# Patient Record
Sex: FEMALE | Race: WHITE | NOT HISPANIC OR LATINO | Employment: OTHER | URBAN - METROPOLITAN AREA
[De-identification: names, ages, dates, MRNs, and addresses within clinical notes are randomized per-mention and may not be internally consistent; named-entity substitution may affect disease eponyms.]

---

## 2017-06-01 ENCOUNTER — COMMUNICATION - HEALTHEAST (OUTPATIENT)
Dept: FAMILY MEDICINE | Facility: CLINIC | Age: 37
End: 2017-06-01

## 2017-06-01 DIAGNOSIS — I10 HTN (HYPERTENSION): ICD-10-CM

## 2017-06-02 ENCOUNTER — COMMUNICATION - HEALTHEAST (OUTPATIENT)
Dept: TELEHEALTH | Facility: CLINIC | Age: 37
End: 2017-06-02

## 2017-06-02 ENCOUNTER — OFFICE VISIT - HEALTHEAST (OUTPATIENT)
Dept: FAMILY MEDICINE | Facility: CLINIC | Age: 37
End: 2017-06-02

## 2017-06-02 DIAGNOSIS — I10 ESSENTIAL HYPERTENSION: ICD-10-CM

## 2017-06-02 DIAGNOSIS — F41.9 ANXIETY: ICD-10-CM

## 2017-06-02 ASSESSMENT — MIFFLIN-ST. JEOR: SCORE: 1859.22

## 2017-06-08 ENCOUNTER — COMMUNICATION - HEALTHEAST (OUTPATIENT)
Dept: SCHEDULING | Facility: CLINIC | Age: 37
End: 2017-06-08

## 2017-06-13 ENCOUNTER — OFFICE VISIT - HEALTHEAST (OUTPATIENT)
Dept: FAMILY MEDICINE | Facility: CLINIC | Age: 37
End: 2017-06-13

## 2017-06-13 DIAGNOSIS — I10 ESSENTIAL HYPERTENSION WITH GOAL BLOOD PRESSURE LESS THAN 130/80: ICD-10-CM

## 2017-06-13 DIAGNOSIS — D50.9 IRON DEFICIENCY ANEMIA: ICD-10-CM

## 2017-07-17 ENCOUNTER — COMMUNICATION - HEALTHEAST (OUTPATIENT)
Dept: FAMILY MEDICINE | Facility: CLINIC | Age: 37
End: 2017-07-17

## 2017-07-17 DIAGNOSIS — E28.2 PCOS (POLYCYSTIC OVARIAN SYNDROME): ICD-10-CM

## 2017-07-19 ENCOUNTER — COMMUNICATION - HEALTHEAST (OUTPATIENT)
Dept: FAMILY MEDICINE | Facility: CLINIC | Age: 37
End: 2017-07-19

## 2017-07-19 DIAGNOSIS — E28.2 PCOS (POLYCYSTIC OVARIAN SYNDROME): ICD-10-CM

## 2017-07-21 ENCOUNTER — COMMUNICATION - HEALTHEAST (OUTPATIENT)
Dept: FAMILY MEDICINE | Facility: CLINIC | Age: 37
End: 2017-07-21

## 2017-07-21 DIAGNOSIS — E28.2 PCOS (POLYCYSTIC OVARIAN SYNDROME): ICD-10-CM

## 2017-07-27 ENCOUNTER — COMMUNICATION - HEALTHEAST (OUTPATIENT)
Dept: FAMILY MEDICINE | Facility: CLINIC | Age: 37
End: 2017-07-27

## 2017-07-27 DIAGNOSIS — E28.2 PCOS (POLYCYSTIC OVARIAN SYNDROME): ICD-10-CM

## 2017-08-10 ENCOUNTER — COMMUNICATION - HEALTHEAST (OUTPATIENT)
Dept: TELEHEALTH | Facility: CLINIC | Age: 37
End: 2017-08-10

## 2017-08-11 ENCOUNTER — COMMUNICATION - HEALTHEAST (OUTPATIENT)
Dept: HEALTH INFORMATION MANAGEMENT | Facility: CLINIC | Age: 37
End: 2017-08-11

## 2017-08-14 ENCOUNTER — COMMUNICATION - HEALTHEAST (OUTPATIENT)
Dept: FAMILY MEDICINE | Facility: CLINIC | Age: 37
End: 2017-08-14

## 2017-08-14 DIAGNOSIS — I10 HTN (HYPERTENSION): ICD-10-CM

## 2017-08-18 ENCOUNTER — OFFICE VISIT - HEALTHEAST (OUTPATIENT)
Dept: FAMILY MEDICINE | Facility: CLINIC | Age: 37
End: 2017-08-18

## 2017-08-18 DIAGNOSIS — H57.13 EYE PAIN, BILATERAL: ICD-10-CM

## 2017-08-21 ENCOUNTER — RECORDS - HEALTHEAST (OUTPATIENT)
Dept: ADMINISTRATIVE | Facility: OTHER | Age: 37
End: 2017-08-21

## 2017-09-25 ENCOUNTER — OFFICE VISIT - HEALTHEAST (OUTPATIENT)
Dept: FAMILY MEDICINE | Facility: CLINIC | Age: 37
End: 2017-09-25

## 2017-09-25 DIAGNOSIS — I10 ESSENTIAL HYPERTENSION WITH GOAL BLOOD PRESSURE LESS THAN 130/80: ICD-10-CM

## 2017-09-25 DIAGNOSIS — R63.5 WEIGHT GAIN: ICD-10-CM

## 2017-09-25 DIAGNOSIS — Z00.00 ROUTINE GENERAL MEDICAL EXAMINATION AT A HEALTH CARE FACILITY: ICD-10-CM

## 2017-09-25 DIAGNOSIS — D50.9 IRON DEFICIENCY ANEMIA: ICD-10-CM

## 2017-09-25 DIAGNOSIS — E28.2 PCOS (POLYCYSTIC OVARIAN SYNDROME): ICD-10-CM

## 2017-09-25 LAB
CHOLEST SERPL-MCNC: 194 MG/DL
FASTING STATUS PATIENT QL REPORTED: NO
HDLC SERPL-MCNC: 58 MG/DL
LDLC SERPL CALC-MCNC: 106 MG/DL
TRIGL SERPL-MCNC: 148 MG/DL

## 2017-09-25 ASSESSMENT — MIFFLIN-ST. JEOR: SCORE: 1952.26

## 2017-10-27 ENCOUNTER — COMMUNICATION - HEALTHEAST (OUTPATIENT)
Dept: FAMILY MEDICINE | Facility: CLINIC | Age: 37
End: 2017-10-27

## 2017-10-27 DIAGNOSIS — I10 HTN (HYPERTENSION): ICD-10-CM

## 2017-10-31 ENCOUNTER — COMMUNICATION - HEALTHEAST (OUTPATIENT)
Dept: FAMILY MEDICINE | Facility: CLINIC | Age: 37
End: 2017-10-31

## 2017-10-31 DIAGNOSIS — E28.2 PCOS (POLYCYSTIC OVARIAN SYNDROME): ICD-10-CM

## 2018-04-18 ENCOUNTER — OFFICE VISIT - HEALTHEAST (OUTPATIENT)
Dept: FAMILY MEDICINE | Facility: CLINIC | Age: 38
End: 2018-04-18

## 2018-04-18 DIAGNOSIS — N89.8 VAGINAL DISCHARGE: ICD-10-CM

## 2018-04-18 DIAGNOSIS — I10 ESSENTIAL HYPERTENSION: ICD-10-CM

## 2018-04-18 DIAGNOSIS — D50.9 IRON DEFICIENCY ANEMIA: ICD-10-CM

## 2018-04-18 DIAGNOSIS — E28.2 PCOS (POLYCYSTIC OVARIAN SYNDROME): ICD-10-CM

## 2018-04-18 LAB
ALBUMIN SERPL-MCNC: 4 G/DL (ref 3.5–5)
ALP SERPL-CCNC: 54 U/L (ref 45–120)
ALT SERPL W P-5'-P-CCNC: 15 U/L (ref 0–45)
ANION GAP SERPL CALCULATED.3IONS-SCNC: 8 MMOL/L (ref 5–18)
AST SERPL W P-5'-P-CCNC: 16 U/L (ref 0–40)
BILIRUB SERPL-MCNC: 0.5 MG/DL (ref 0–1)
BUN SERPL-MCNC: 15 MG/DL (ref 8–22)
CALCIUM SERPL-MCNC: 9.8 MG/DL (ref 8.5–10.5)
CHLORIDE BLD-SCNC: 103 MMOL/L (ref 98–107)
CLUE CELLS: NORMAL
CO2 SERPL-SCNC: 29 MMOL/L (ref 22–31)
CREAT SERPL-MCNC: 0.94 MG/DL (ref 0.6–1.1)
ERYTHROCYTE [DISTWIDTH] IN BLOOD BY AUTOMATED COUNT: 17.3 % (ref 11–14.5)
GFR SERPL CREATININE-BSD FRML MDRD: >60 ML/MIN/1.73M2
GLUCOSE BLD-MCNC: 89 MG/DL (ref 70–125)
HBA1C MFR BLD: 5 % (ref 3.5–6)
HCT VFR BLD AUTO: 29.5 % (ref 35–47)
HGB BLD-MCNC: 9.5 G/DL (ref 12–16)
MCH RBC QN AUTO: 22.7 PG (ref 27–34)
MCHC RBC AUTO-ENTMCNC: 32.3 G/DL (ref 32–36)
MCV RBC AUTO: 70 FL (ref 80–100)
PLATELET # BLD AUTO: 265 THOU/UL (ref 140–440)
PMV BLD AUTO: 9.3 FL (ref 7–10)
POTASSIUM BLD-SCNC: 4.4 MMOL/L (ref 3.5–5)
PROT SERPL-MCNC: 7 G/DL (ref 6–8)
RBC # BLD AUTO: 4.19 MILL/UL (ref 3.8–5.4)
SODIUM SERPL-SCNC: 140 MMOL/L (ref 136–145)
TRICHOMONAS, WET PREP: NORMAL
WBC: 4.4 THOU/UL (ref 4–11)
YEAST, WET PREP: NORMAL

## 2018-05-01 ENCOUNTER — COMMUNICATION - HEALTHEAST (OUTPATIENT)
Dept: FAMILY MEDICINE | Facility: CLINIC | Age: 38
End: 2018-05-01

## 2018-05-01 DIAGNOSIS — N89.8 VAGINA ITCHING: ICD-10-CM

## 2018-05-01 DIAGNOSIS — D50.9 IRON DEFICIENCY ANEMIA: ICD-10-CM

## 2018-05-20 ENCOUNTER — COMMUNICATION - HEALTHEAST (OUTPATIENT)
Dept: FAMILY MEDICINE | Facility: CLINIC | Age: 38
End: 2018-05-20

## 2018-05-20 DIAGNOSIS — I10 HTN (HYPERTENSION): ICD-10-CM

## 2018-09-30 ENCOUNTER — COMMUNICATION - HEALTHEAST (OUTPATIENT)
Dept: FAMILY MEDICINE | Facility: CLINIC | Age: 38
End: 2018-09-30

## 2018-09-30 DIAGNOSIS — I10 ESSENTIAL HYPERTENSION WITH GOAL BLOOD PRESSURE LESS THAN 130/80: ICD-10-CM

## 2018-11-05 ENCOUNTER — OFFICE VISIT - HEALTHEAST (OUTPATIENT)
Dept: FAMILY MEDICINE | Facility: CLINIC | Age: 38
End: 2018-11-05

## 2018-11-05 DIAGNOSIS — R00.2 HEART PALPITATIONS: ICD-10-CM

## 2018-11-05 DIAGNOSIS — D50.9 IRON DEFICIENCY ANEMIA: ICD-10-CM

## 2018-11-05 DIAGNOSIS — Z13.220 LIPID SCREENING: ICD-10-CM

## 2018-11-05 DIAGNOSIS — Z23 IMMUNIZATION DUE: ICD-10-CM

## 2018-11-05 DIAGNOSIS — I10 ESSENTIAL HYPERTENSION: ICD-10-CM

## 2018-11-05 DIAGNOSIS — Z00.00 ROUTINE GENERAL MEDICAL EXAMINATION AT A HEALTH CARE FACILITY: ICD-10-CM

## 2018-11-05 DIAGNOSIS — E28.2 PCOS (POLYCYSTIC OVARIAN SYNDROME): ICD-10-CM

## 2018-11-05 LAB
ALBUMIN SERPL-MCNC: 4.2 G/DL (ref 3.5–5)
ALP SERPL-CCNC: 57 U/L (ref 45–120)
ALT SERPL W P-5'-P-CCNC: 14 U/L (ref 0–45)
ANION GAP SERPL CALCULATED.3IONS-SCNC: 11 MMOL/L (ref 5–18)
AST SERPL W P-5'-P-CCNC: 15 U/L (ref 0–40)
ATRIAL RATE - MUSE: 68 BPM
BILIRUB SERPL-MCNC: 0.7 MG/DL (ref 0–1)
BUN SERPL-MCNC: 10 MG/DL (ref 8–22)
CALCIUM SERPL-MCNC: 9.8 MG/DL (ref 8.5–10.5)
CHLORIDE BLD-SCNC: 106 MMOL/L (ref 98–107)
CHOLEST SERPL-MCNC: 208 MG/DL
CO2 SERPL-SCNC: 23 MMOL/L (ref 22–31)
CREAT SERPL-MCNC: 0.79 MG/DL (ref 0.6–1.1)
DIASTOLIC BLOOD PRESSURE - MUSE: NORMAL MMHG
ERYTHROCYTE [DISTWIDTH] IN BLOOD BY AUTOMATED COUNT: 14.6 % (ref 11–14.5)
FASTING STATUS PATIENT QL REPORTED: YES
FERRITIN SERPL-MCNC: 9 NG/ML (ref 10–130)
GFR SERPL CREATININE-BSD FRML MDRD: >60 ML/MIN/1.73M2
GLUCOSE BLD-MCNC: 89 MG/DL (ref 70–125)
HBA1C MFR BLD: 5.1 % (ref 3.5–6)
HCT VFR BLD AUTO: 38.5 % (ref 35–47)
HDLC SERPL-MCNC: 53 MG/DL
HGB BLD-MCNC: 13 G/DL (ref 12–16)
INTERPRETATION ECG - MUSE: NORMAL
IRON SATN MFR SERPL: 77 % (ref 20–50)
IRON SERPL-MCNC: 314 UG/DL (ref 42–175)
LDLC SERPL CALC-MCNC: 130 MG/DL
MCH RBC QN AUTO: 28.5 PG (ref 27–34)
MCHC RBC AUTO-ENTMCNC: 33.7 G/DL (ref 32–36)
MCV RBC AUTO: 84 FL (ref 80–100)
P AXIS - MUSE: -5 DEGREES
PLATELET # BLD AUTO: 252 THOU/UL (ref 140–440)
PMV BLD AUTO: 8.2 FL (ref 7–10)
POTASSIUM BLD-SCNC: 3.8 MMOL/L (ref 3.5–5)
PR INTERVAL - MUSE: 178 MS
PROT SERPL-MCNC: 7 G/DL (ref 6–8)
QRS DURATION - MUSE: 92 MS
QT - MUSE: 410 MS
QTC - MUSE: 435 MS
R AXIS - MUSE: -23 DEGREES
RBC # BLD AUTO: 4.56 MILL/UL (ref 3.8–5.4)
SODIUM SERPL-SCNC: 140 MMOL/L (ref 136–145)
SYSTOLIC BLOOD PRESSURE - MUSE: NORMAL MMHG
T AXIS - MUSE: 1 DEGREES
TIBC SERPL-MCNC: 406 UG/DL (ref 313–563)
TRANSFERRIN SERPL-MCNC: 325 MG/DL (ref 212–360)
TRIGL SERPL-MCNC: 123 MG/DL
TSH SERPL DL<=0.005 MIU/L-ACNC: 3.14 UIU/ML (ref 0.3–5)
VENTRICULAR RATE- MUSE: 68 BPM
WBC: 4.8 THOU/UL (ref 4–11)

## 2018-11-05 ASSESSMENT — MIFFLIN-ST. JEOR: SCORE: 1928.68

## 2018-11-12 ENCOUNTER — HOSPITAL ENCOUNTER (OUTPATIENT)
Dept: CARDIOLOGY | Facility: HOSPITAL | Age: 38
Discharge: HOME | End: 2018-11-12
Attending: FAMILY MEDICINE

## 2018-11-12 DIAGNOSIS — R00.2 HEART PALPITATIONS: ICD-10-CM

## 2018-12-26 ENCOUNTER — COMMUNICATION - HEALTHEAST (OUTPATIENT)
Dept: FAMILY MEDICINE | Facility: CLINIC | Age: 38
End: 2018-12-26

## 2018-12-26 DIAGNOSIS — E28.2 PCOS (POLYCYSTIC OVARIAN SYNDROME): ICD-10-CM

## 2019-01-09 ENCOUNTER — COMMUNICATION - HEALTHEAST (OUTPATIENT)
Dept: FAMILY MEDICINE | Facility: CLINIC | Age: 39
End: 2019-01-09

## 2019-01-09 DIAGNOSIS — I10 ESSENTIAL HYPERTENSION WITH GOAL BLOOD PRESSURE LESS THAN 130/80: ICD-10-CM

## 2019-05-21 ENCOUNTER — COMMUNICATION - HEALTHEAST (OUTPATIENT)
Dept: FAMILY MEDICINE | Facility: CLINIC | Age: 39
End: 2019-05-21

## 2019-05-21 DIAGNOSIS — I10 HTN (HYPERTENSION): ICD-10-CM

## 2019-06-26 ENCOUNTER — COMMUNICATION - HEALTHEAST (OUTPATIENT)
Dept: FAMILY MEDICINE | Facility: CLINIC | Age: 39
End: 2019-06-26

## 2019-06-26 DIAGNOSIS — E28.2 PCOS (POLYCYSTIC OVARIAN SYNDROME): ICD-10-CM

## 2019-08-07 ENCOUNTER — COMMUNICATION - HEALTHEAST (OUTPATIENT)
Dept: TELEHEALTH | Facility: CLINIC | Age: 39
End: 2019-08-07

## 2019-10-22 ENCOUNTER — COMMUNICATION - HEALTHEAST (OUTPATIENT)
Dept: FAMILY MEDICINE | Facility: CLINIC | Age: 39
End: 2019-10-22

## 2019-10-22 DIAGNOSIS — I10 ESSENTIAL HYPERTENSION WITH GOAL BLOOD PRESSURE LESS THAN 130/80: ICD-10-CM

## 2019-11-13 ENCOUNTER — OFFICE VISIT - HEALTHEAST (OUTPATIENT)
Dept: FAMILY MEDICINE | Facility: CLINIC | Age: 39
End: 2019-11-13

## 2019-11-13 DIAGNOSIS — E66.01 MORBID OBESITY (H): ICD-10-CM

## 2019-11-13 DIAGNOSIS — D50.0 IRON DEFICIENCY ANEMIA DUE TO CHRONIC BLOOD LOSS: ICD-10-CM

## 2019-11-13 DIAGNOSIS — I10 HTN (HYPERTENSION): ICD-10-CM

## 2019-11-13 DIAGNOSIS — E28.2 PCOS (POLYCYSTIC OVARIAN SYNDROME): ICD-10-CM

## 2019-11-13 DIAGNOSIS — N92.4 EXCESSIVE BLEEDING IN PREMENOPAUSAL PERIOD: ICD-10-CM

## 2019-11-13 DIAGNOSIS — Z00.00 ROUTINE GENERAL MEDICAL EXAMINATION AT A HEALTH CARE FACILITY: ICD-10-CM

## 2019-11-13 DIAGNOSIS — I10 ESSENTIAL HYPERTENSION WITH GOAL BLOOD PRESSURE LESS THAN 130/80: ICD-10-CM

## 2019-11-13 DIAGNOSIS — Z13.220 LIPID SCREENING: ICD-10-CM

## 2019-11-13 DIAGNOSIS — Z23 IMMUNIZATION DUE: ICD-10-CM

## 2019-11-13 DIAGNOSIS — K44.9 DIAPHRAGMATIC HERNIA WITHOUT OBSTRUCTION AND WITHOUT GANGRENE: ICD-10-CM

## 2019-11-13 LAB
ALBUMIN SERPL-MCNC: 4.3 G/DL (ref 3.5–5)
ALP SERPL-CCNC: 51 U/L (ref 45–120)
ALT SERPL W P-5'-P-CCNC: 20 U/L (ref 0–45)
ANION GAP SERPL CALCULATED.3IONS-SCNC: 10 MMOL/L (ref 5–18)
AST SERPL W P-5'-P-CCNC: 19 U/L (ref 0–40)
BILIRUB SERPL-MCNC: 0.7 MG/DL (ref 0–1)
BUN SERPL-MCNC: 11 MG/DL (ref 8–22)
CALCIUM SERPL-MCNC: 9.7 MG/DL (ref 8.5–10.5)
CHLORIDE BLD-SCNC: 105 MMOL/L (ref 98–107)
CHOLEST SERPL-MCNC: 207 MG/DL
CO2 SERPL-SCNC: 24 MMOL/L (ref 22–31)
CREAT SERPL-MCNC: 0.82 MG/DL (ref 0.6–1.1)
ERYTHROCYTE [DISTWIDTH] IN BLOOD BY AUTOMATED COUNT: 15.8 % (ref 11–14.5)
FASTING STATUS PATIENT QL REPORTED: YES
FERRITIN SERPL-MCNC: 3 NG/ML (ref 10–130)
GFR SERPL CREATININE-BSD FRML MDRD: >60 ML/MIN/1.73M2
GLUCOSE BLD-MCNC: 83 MG/DL (ref 70–125)
HBA1C MFR BLD: 4.9 % (ref 3.5–6)
HCT VFR BLD AUTO: 29.4 % (ref 35–47)
HDLC SERPL-MCNC: 60 MG/DL
HGB BLD-MCNC: 9.7 G/DL (ref 12–16)
IRON SATN MFR SERPL: 6 % (ref 20–50)
IRON SERPL-MCNC: 27 UG/DL (ref 42–175)
LDLC SERPL CALC-MCNC: 126 MG/DL
MAGNESIUM SERPL-MCNC: 2 MG/DL (ref 1.8–2.6)
MCH RBC QN AUTO: 23.3 PG (ref 27–34)
MCHC RBC AUTO-ENTMCNC: 32.8 G/DL (ref 32–36)
MCV RBC AUTO: 71 FL (ref 80–100)
PLATELET # BLD AUTO: 264 THOU/UL (ref 140–440)
PMV BLD AUTO: 8.7 FL (ref 7–10)
POTASSIUM BLD-SCNC: 4.1 MMOL/L (ref 3.5–5)
PROT SERPL-MCNC: 6.9 G/DL (ref 6–8)
RBC # BLD AUTO: 4.15 MILL/UL (ref 3.8–5.4)
SODIUM SERPL-SCNC: 139 MMOL/L (ref 136–145)
TIBC SERPL-MCNC: 490 UG/DL (ref 313–563)
TRANSFERRIN SERPL-MCNC: 392 MG/DL (ref 212–360)
TRIGL SERPL-MCNC: 103 MG/DL
TSH SERPL DL<=0.005 MIU/L-ACNC: 2.32 UIU/ML (ref 0.3–5)
WBC: 5.3 THOU/UL (ref 4–11)

## 2019-11-13 ASSESSMENT — MIFFLIN-ST. JEOR: SCORE: 1925.5

## 2019-11-13 ASSESSMENT — PATIENT HEALTH QUESTIONNAIRE - PHQ9: SUM OF ALL RESPONSES TO PHQ QUESTIONS 1-9: 2

## 2019-11-14 ENCOUNTER — AMBULATORY - HEALTHEAST (OUTPATIENT)
Dept: FAMILY MEDICINE | Facility: CLINIC | Age: 39
End: 2019-11-14

## 2019-11-14 DIAGNOSIS — D50.9 IRON DEFICIENCY ANEMIA: ICD-10-CM

## 2019-11-14 RX ORDER — FERROUS SULFATE 325(65) MG
1 TABLET ORAL 2 TIMES DAILY WITH MEALS
Qty: 180 TABLET | Refills: 3 | Status: SHIPPED | OUTPATIENT
Start: 2019-11-14 | End: 2022-10-06 | Stop reason: SINTOL

## 2019-12-03 ENCOUNTER — HOSPITAL ENCOUNTER (OUTPATIENT)
Dept: ULTRASOUND IMAGING | Facility: CLINIC | Age: 39
Discharge: HOME | End: 2019-12-03
Attending: FAMILY MEDICINE

## 2019-12-03 DIAGNOSIS — N92.4 EXCESSIVE BLEEDING IN PREMENOPAUSAL PERIOD: ICD-10-CM

## 2020-01-30 ENCOUNTER — COMMUNICATION - HEALTHEAST (OUTPATIENT)
Dept: FAMILY MEDICINE | Facility: CLINIC | Age: 40
End: 2020-01-30

## 2020-01-30 DIAGNOSIS — I10 ESSENTIAL HYPERTENSION WITH GOAL BLOOD PRESSURE LESS THAN 130/80: ICD-10-CM

## 2020-03-13 ENCOUNTER — VIRTUAL VISIT (OUTPATIENT)
Dept: FAMILY MEDICINE | Facility: OTHER | Age: 40
End: 2020-03-13

## 2020-03-16 ENCOUNTER — OFFICE VISIT - HEALTHEAST (OUTPATIENT)
Dept: FAMILY MEDICINE | Facility: CLINIC | Age: 40
End: 2020-03-16

## 2020-03-16 DIAGNOSIS — R05.9 COUGH: ICD-10-CM

## 2020-03-18 NOTE — PROGRESS NOTES
"Date: 2020 09:12:45  Clinician: Glory Bridges  Clinician NPI: [id number]  Patient: Karyna Hammer  Patient : 1980  Patient Address: [address] [place] [address], [place] [address]  Patient Phone: [phone]  Visit Protocol: URI  Patient Summary:  Karyna is a 39 year old ( : 1980 ) female who initiated a Visit for COVID-19 (Coronavirus) evaluation and screening. When asked the question \"Please sign me up to receive news, health information and promotions from [employer].\", Karyna responded \"No\".    Karyna states her symptoms started gradually 3-6 days ago.   Her symptoms consist of malaise, a cough, and a headache. Karyna also feels feverish.   Symptom details     Cough: Karyna coughs a few times an hour and her cough is not more bothersome at night. Phlegm does not come into her throat when she coughs. She does not believe her cough is caused by post-nasal drip.     Temperature: Her current temperature is 100.0 degrees Fahrenheit.     Headache: She states the headache is mild (1-3 on a 10 point pain scale).      Karyna denies having rhinitis, wheezing, ear pain, sore throat, nasal congestion, teeth pain, enlarged lymph nodes, chills, facial pain or pressure, and myalgias. She also denies double sickening (worsening symptoms after initial improvement), taking antibiotic medication for the symptoms, having recent facial or sinus surgery in the past 60 days, and having a sinus infection within the past year. She is not experiencing dyspnea.   Precipitating events  She has not recently been exposed to someone with influenza. Karyna has not been in close contact with any high risk individuals.   Pertinent COVID-19 (Coronavirus) information  Karyna has not traveled internationally in the last 14 days before the start of her symptoms.   Karyna has not had close contact with a laboratory confirmed positive COVID-19 patient within 14 days of symptom onset. 100.0   Pertinent medical history  Karyna does not get " yeast infections when she takes antibiotics.   Karyna does not need a return to work/school note.   Weight: 230 lbs   Karyna does not smoke or use smokeless tobacco.   She denies pregnancy and denies breastfeeding. She has menstruated in the past month.   Additional information as reported by the patient (free text): My sister was in [place] within the last month (but not 14 days) and has not yet been tested.  Presenting symptoms of fever, headache, and excess tiredness.  Hoping to be cleared for drive up testing to determine Covid-19   Weight: 230 lbs    MEDICATIONS: lisinopril-hydrochlorothiazide oral, labetalol oral, metformin oral, ALLERGIES: NKDA  Clinician Response:  Dear Karyna,   Based on the information you have provided, it does not appear you meet the criteria for Coronavirus (COVID-19) testing.   At this time, we recommend testing only for those people who have symptoms of cough and fever and have either traveled to a known area of infection or have been exposed to someone with laboratory confirmed Coronavirus by close contact.  In your case specifically, I would recommend your sister go get tested if she qualifies (but if her symptoms started more than 14 days after she returned from [place], she would not qualify for testing as that is not a risk then of exposure). If she does qualify, I would recommend you await her results and if positive, you would need testing. In the meantime, if she does meet criteria for testing, l would recommend you go into isolation in anticipation of her test. Isolation and symptom control are the recommended treatment for COVID either way.&nbsp;        What does this mean?  Isolate Yourself:   Isolate yourself at home.  Do Not allow any visitors  Do Not go to work or school  Do Not go to [employer],  centers, shopping, or other public places.  Do Not shake hands.  Avoid close contact with others (hugging, kissing).   Protect Others:   Cover Your Mouth and Nose with a mask,  disposable tissue or wash cloth to avoid spreading germs to others.  Wash your hands and face frequently with soap and water.   If you have not developed a cough with fever by day 15 of isolation you are considered uninfected.  If you develop cough and fever, submit a new visit to us so we can arrange [facility] COVID testing.  Thank you for limiting contact with others, wearing a simple mask to cover your cough, practice good hand hygiene habits and accessing our [employer] services where possible to limit the spread of this virus.     Coronavirus - General Information:   The coronavirus infection starts within 14 days of an exposure.  Symptoms are those of a respiratory infection (such as fever, cough).  If you have not had symptoms by day 15, you should be considered uninfected by coronavirus.   Coronavirus - Symptoms:   The coronavirus can cause a respiratory illness, such as bronchitis or pneumonia.  The most common symptoms are: cough, fever, and shortness of breath.  Other symptoms are: body aches, chills, diarrhea, fatigue, headache, runny nose, and sore throat   Coronavirus - Exposure Risk Factors:   Exposure to a person who has been diagnosed with coronavirus.  Travel from an area with recent local transmission of coronavirus.  The CDC (www.cdc.gov) has the most up-to-date list of where the coronavirus outbreak is occurring.   Coronavirus - Spreading:   The virus likely spreads through respiratory droplets produced when a person coughs or sneezes. These respiratory droplets can travel approximately 6 feet and can remain on surfaces. Common disinfectants will kill the virus.  The CDC currently does not recommend healthy people wear masks.   Coronavirus - Protect Yourself:   Avoid close contact with people known to have this new coronavirus infection.  Wash hands often with soap and water or alcohol-based hand .  Avoid touching the eyes, nose or mouth.   Thank you for limiting contact with others, wearing  a simple mask to cover your cough, practice good hand hygiene habits and accessing our virtual services where possible to limit the spread of this virus.  For more information about COVID19 and options for caring for yourself at home, please visit the CDC website at&nbsp;https://www.cdc.gov/coronavirus/2019-ncov/about/steps-when-sick.html  For more options for care at [facility], please visit our website at&nbsp;[email or web address]             Diagnosis: Cough  Diagnosis ICD: R05

## 2020-03-22 ENCOUNTER — COMMUNICATION - HEALTHEAST (OUTPATIENT)
Dept: FAMILY MEDICINE | Facility: CLINIC | Age: 40
End: 2020-03-22

## 2020-03-23 ENCOUNTER — COMMUNICATION - HEALTHEAST (OUTPATIENT)
Dept: FAMILY MEDICINE | Facility: CLINIC | Age: 40
End: 2020-03-23

## 2020-03-24 LAB
COVID OVERALL RESULT - HISTORICAL: NOT DETECTED
PAN SARS RNA (HISTORICAL CONVERSION): NEGATIVE
PATIENT SYMPTOMATIC (HISTORICAL CONVERSION): NORMAL
SARS COV 2 RNA - HISTORICAL: NEGATIVE
SOURCE ARUP COVID - HISTORICAL: NORMAL

## 2020-03-25 ENCOUNTER — NURSE TRIAGE (OUTPATIENT)
Dept: NURSING | Facility: CLINIC | Age: 40
End: 2020-03-25

## 2020-03-25 ENCOUNTER — VIRTUAL VISIT (OUTPATIENT)
Dept: FAMILY MEDICINE | Facility: OTHER | Age: 40
End: 2020-03-25

## 2020-03-25 ENCOUNTER — COMMUNICATION - HEALTHEAST (OUTPATIENT)
Dept: SCHEDULING | Facility: CLINIC | Age: 40
End: 2020-03-25

## 2020-03-26 ENCOUNTER — COMMUNICATION - HEALTHEAST (OUTPATIENT)
Dept: FAMILY MEDICINE | Facility: CLINIC | Age: 40
End: 2020-03-26

## 2020-03-26 NOTE — TELEPHONE ENCOUNTER
Patient reports she took COVID-19 test on 3/16- went to [facility]- patient has not received her results yet.     Writer called the clinic, who said the results were still in process. Clinic gave me MD number to give to patient to inquire about her sample.     Patient states she tried calling [facility] and they don't have a sample with her name on it. Patient reports there was a [facility] article showing there were missing samples.     Advised patient to call [facility] lab tomorrow when they open to ask about sample.     Patient verbalized understanding and had no further questions.    Juliane Jenkins, RN/AIDE [facility]    Reason for Disposition    General information question, no triage required and triager able to answer question    Additional Information    Negative: [1] Caller is not with the adult (patient) AND [2] reporting urgent symptoms    Negative: Lab result questions    Negative: Medication questions    Negative: Caller can't be reached by phone    Negative: Caller has already spoken to PCP or another triager    Negative: RN needs further essential information from caller in order to complete triage    Negative: Requesting regular office appointment    Negative: [1] Caller requesting NON-URGENT health information AND [2] PCP's office is the best resource    Negative: Health Information question, no triage required and triager able to answer question    Protocols used: INFORMATION ONLY CALL-A-

## 2020-03-26 NOTE — PROGRESS NOTES
"Date: 2020 18:39:45  Clinician: Rober Berger  Clinician NPI: [id number]  Patient: Karyna Hammer  Patient : 1980  Patient Address: [address] [place] [address], [place] [address]  Patient Phone: [phone]  Visit Protocol: URI  Patient Summary:  Karyna is a 39 year old ( : 1980 ) female who initiated a Visit for COVID-19 (Coronavirus) evaluation and screening. When asked the question \"Please sign me up to receive news, health information and promotions from [employer].\", Karyna responded \"No\".    Karyna states her symptoms started gradually 10-13 days ago.   Her symptoms consist of malaise, chills, a headache, myalgia, a sore throat, and a cough. She is experiencing mild difficulty breathing with activities but can speak normally in full sentences. Karyna also feels feverish.   Symptom details     Cough: Karyna coughs a few times an hour and her cough is more bothersome at night. Phlegm does not come into her throat when she coughs. She does not believe her cough is caused by post-nasal drip.     Sore throat: Karyna reports having mild throat pain (1-3 on a 10 point pain scale), does not have exudate on her tonsils, and can swallow liquids. The lymph nodes in her neck are not enlarged. A rash has not appeared on the skin since the sore throat started.     Temperature: Her current temperature is 99.8 degrees Fahrenheit.     Headache: She states the headache is moderate (4-6 on a 10 point pain scale).      Karyna denies having enlarged lymph nodes, ear pain, rhinitis, teeth pain, facial pain or pressure, wheezing, and nasal congestion. She also denies double sickening (worsening symptoms after initial improvement), having a sinus infection within the past year, taking antibiotic medication for the symptoms, and having recent facial or sinus surgery in the past 60 days.   Precipitating events  Within the past week, Karyna has not been exposed to someone with strep throat. She has not recently been exposed " to someone with influenza. Karyna has not been in close contact with any high risk individuals.   Pertinent COVID-19 (Coronavirus) information  Karyna has not traveled internationally or to the areas where COVID-19 (Coronavirus) is widespread, including cruise ship travel in the last 14 days before the start of her symptoms.   Karyna has not had a close contact with a laboratory-confirmed COVID-19 patient within 14 days of symptom onset. She has had a close contact with a suspected COVID-19 patient within 14 days of symptom onset. Additional information about contact with COVID-19 (Coronavirus) patient as reported by the patient (free text): I was tested on 3/16 at the [facility] site.  They indicated results would be available within 3-5 days and we are now at day 9.  No one can seem to tell me where my test is and I am concerned with my ongoing fever if I test negative   Karyna is not a healthcare worker or a  and does not work in a healthcare facility. She is not a family member of a healthcare worker and does not live with someone who is a healthcare worker.   Pertinent medical history  Karyna does not get yeast infections when she takes antibiotics.   Karyna does not need a return to work/school note.   Weight: 265 lbs   Karyna does not smoke or use smokeless tobacco.   She denies pregnancy and denies breastfeeding. She is currently menstruating.   Additional information as reported by the patient (free text): Per earlier I have already been tested for Covid-19 at the [facility] location on 3/16.  I have been quarantined and symptomatic the entire time.  I cannot obtain an answer on my test status and we are beyond the 5 day window indicated for results.  [facility] is currently investigating my case and others without results.  They verified my sample was not at the [facility] and referred me back to my provider.  I need my results, if I am negative I need to seek separate treatment for the  fever.   Weight: 265 lbs    MEDICATIONS: lisinopril-hydrochlorothiazide oral, labetalol oral, metformin oral, ALLERGIES: NKDA  Clinician Response:  Dear Karyna,  I am sorry you are not feeling well. Your health is our priority. Based on the information you have provided, it is possible that you may have some type of viral infection.  Please read the full treatment plan and see my recommendations below.  Medication information  Because you have a viral infection, antibiotics will not help you get better. Treating a viral infection with antibiotics could actually make you feel worse.  Unless you are allergic to the over-the-counter medication(s) below, I recommend using:     Acetaminophen (Tylenol or store brand) oral tablet. Take 1-2 tablets by mouth every 4-6 hours to help with the discomfort.   Over-the-counter medications do not require a prescription. Ask the pharmacist if you have any questions.  Self care  Steps you can take to be as comfortable as possible:     Rest.    Drink plenty of water and other liquids.    Use throat lozenges.    Gargle with warm salt water (1/4 teaspoon of salt per 8 ounce glass of water).    Suck on frozen items such as popsicles or ice cubes.    Drink hot tea with lemon and honey.    Take a spoonful of honey to reduce your cough.     COVID-19 (Coronavirus) General Information  With the increase in the number of COVID-19 (Coronavirus) cases, we understand you may have some questions. Below is some helpful information on COVID-19 (Coronavirus).  How can I protect myself and others from the COVID-19 (Coronavirus)?  Because there is currently no vaccine to prevent infection, the best way to protect yourself is to avoid being exposed to this virus. Put distance between yourself and other people if COVID-19 (Coronavirus) is spreading in your community. The virus is thought to spread mainly from person-to-person.     Between people who are in close contact with one another (within about 6  about) for a prolonged period (10 minutes or longer).    Through respiratory droplets produced when an infected person coughs or sneezes.     The CDC recommends the following additional steps to protect yourself and others:     Wash your hands often with soap and water for at least 20 seconds, especially after blowing your nose, coughing, or sneezing; going to the bathroom; and before eating or preparing food.  Use an alcohol-based hand  that contains at least 60 percent alcohol if soap and water are not available.        Avoid touching your eyes, nose and mouth with unwashed hands.    Avoid close contact with people who are sick.    Stay home when you are sick.    Cover your cough or sneeze with a tissue, then throw the tissue in the trash.    Clean and disinfect frequently touched objects and surfaces.     You can help stop COVID-19 (Coronavirus) by knowing the signs and symptoms:     Fever    Cough    Shortness of breath     Contact your healthcare provider if   Develop symptoms   AND   Have been in close contact with a person known to have COVID-19 (Coronavirus) or live in or have recently traveled from an area with ongoing spread of COVID-19 (Coronavirus). Call ahead before you go to a doctor's office or emergency room. Tell them about your recent travel and your symptoms.   For the most up to date information, visit the CDC's website.  Self-monitoring  Self-monitoring means people should monitor themselves for fever by taking their temperatures twice a day and remain alert for a cough or difficulty breathing.  It is important to check your health two times each day for 14 days after a potential exposure to a person with COVID-19 (Coronavirus) or after travel from a location where COVID-19 (Coronavirus) is widespread. If you have been exposed to a person with COVID-19 (Coronavirus), it may take up to 14 days to know if you will get sick. Follow the steps below to check and record your health.     Take  your temperature with a thermometer twice a day, once in the morning and once in the evening, and watch for a cough or difficulty breathing for 14 days.    Write down your temperature and any COVID-19 symptoms you may have: feeling feverish, coughing, or difficulty breathing.    Stay home from work or school.    Do not take public transportation, taxis, or ride-shares.    Avoid crowded places (such as shopping centers and movie theaters) and limit your activities in public.    Keep your distance from others (about 6 feet or 2 meters).    If you get sick with fever, cough, or trouble breathing, contact your healthcare provider and tell them about your recent travel and/or your symptoms.    If you need to seek medical care for other reasons, such as dialysis, call ahead to your doctor and tell them about your recent travel.     Steps to help prevent the spread of COVID-19 (Coronavirus) if you are sick  If you are sick with COVID-19 (Coronavirus) or suspect you are infected with the virus that causes COVID-19 (Coronavirus), follow the steps below to help prevent the disease from spreading&nbsp;to people in your home and community.     Stay home except to get medical care. Home isolation may be started in consultation with your healthcare clinician.    Separate yourself from other people and animals in your home.    Call ahead before visiting your doctor if you have a medical appointment.    Wear a facemask when you are around other people.    Cover your cough and sneezes.    Clean your hands often.    Avoid sharing personal household items.    Clean and disinfect frequently touched objects and surfaces everyday.    You will need to have someone drop off medications or household supplies (if needed) at your house without coming inside or in contact with you or others living in your house.    Monitor your symptoms and seek prompt medical care if your illness is worsening (e.g. Difficulty breathing).    Discontinue home  "isolation only in consultation with your healthcare provider.     For more detailed and up to date information on what to do if you are sick, visit this link: What to Do If You Are Sick With COVID-19.  Do I need to be tested for COVID-19 (Coronavirus)?     Not everyone needs to be tested for COVID-19 (Coronavirus). Decisions on which patients receive testing will be based on the local spread of COVID-19 (Coronavirus) as well as the symptoms. Your healthcare provider will make the final decision on whether you should be tested.    In the meantime, if you have concerns that you may have been exposed, it is reasonable to practice \"social distancing.\"&nbsp; If you are ill with a cold or flu-like illness, please monitor your symptoms and call your healthcare provider if your symptoms worsen.    For more up to date information, visit this link: COVID-19 (Coronavirus) Frequently Asked Questions and Answers.      Diagnosis: COVID-19 (Coronavirus) concern  Diagnosis ICD: Z03.818  "

## 2020-06-15 ENCOUNTER — COMMUNICATION - HEALTHEAST (OUTPATIENT)
Dept: FAMILY MEDICINE | Facility: CLINIC | Age: 40
End: 2020-06-15

## 2020-06-15 DIAGNOSIS — R50.9 FEVER, UNSPECIFIED FEVER CAUSE: ICD-10-CM

## 2020-06-17 ENCOUNTER — AMBULATORY - HEALTHEAST (OUTPATIENT)
Dept: LAB | Facility: CLINIC | Age: 40
End: 2020-06-17

## 2020-06-17 DIAGNOSIS — R50.9 FEVER, UNSPECIFIED FEVER CAUSE: ICD-10-CM

## 2020-06-20 ENCOUNTER — COMMUNICATION - HEALTHEAST (OUTPATIENT)
Dept: LAB | Facility: CLINIC | Age: 40
End: 2020-06-20

## 2020-09-15 ENCOUNTER — RECORDS - HEALTHEAST (OUTPATIENT)
Dept: ADMINISTRATIVE | Facility: OTHER | Age: 40
End: 2020-09-15

## 2020-09-21 ENCOUNTER — RECORDS - HEALTHEAST (OUTPATIENT)
Dept: ADMINISTRATIVE | Facility: OTHER | Age: 40
End: 2020-09-21

## 2020-12-08 ENCOUNTER — COMMUNICATION - HEALTHEAST (OUTPATIENT)
Dept: FAMILY MEDICINE | Facility: CLINIC | Age: 40
End: 2020-12-08

## 2020-12-08 DIAGNOSIS — E28.2 PCOS (POLYCYSTIC OVARIAN SYNDROME): ICD-10-CM

## 2021-01-14 ENCOUNTER — COMMUNICATION - HEALTHEAST (OUTPATIENT)
Dept: FAMILY MEDICINE | Facility: CLINIC | Age: 41
End: 2021-01-14

## 2021-01-14 DIAGNOSIS — I10 HTN (HYPERTENSION): ICD-10-CM

## 2021-01-27 ENCOUNTER — OFFICE VISIT - HEALTHEAST (OUTPATIENT)
Dept: FAMILY MEDICINE | Facility: CLINIC | Age: 41
End: 2021-01-27

## 2021-01-27 DIAGNOSIS — E28.2 PCOS (POLYCYSTIC OVARIAN SYNDROME): ICD-10-CM

## 2021-01-27 DIAGNOSIS — D50.9 IRON DEFICIENCY ANEMIA, UNSPECIFIED IRON DEFICIENCY ANEMIA TYPE: ICD-10-CM

## 2021-01-27 DIAGNOSIS — Z13.220 LIPID SCREENING: ICD-10-CM

## 2021-01-27 DIAGNOSIS — Z86.16 HISTORY OF COVID-19: ICD-10-CM

## 2021-01-27 DIAGNOSIS — I10 HTN (HYPERTENSION): ICD-10-CM

## 2021-01-27 LAB
ALBUMIN SERPL-MCNC: 3.9 G/DL (ref 3.5–5)
ALP SERPL-CCNC: 50 U/L (ref 45–120)
ALT SERPL W P-5'-P-CCNC: 13 U/L (ref 0–45)
ANION GAP SERPL CALCULATED.3IONS-SCNC: 10 MMOL/L (ref 5–18)
AST SERPL W P-5'-P-CCNC: 16 U/L (ref 0–40)
BILIRUB SERPL-MCNC: 0.7 MG/DL (ref 0–1)
BUN SERPL-MCNC: 10 MG/DL (ref 8–22)
CALCIUM SERPL-MCNC: 9.1 MG/DL (ref 8.5–10.5)
CHLORIDE BLD-SCNC: 104 MMOL/L (ref 98–107)
CHOLEST SERPL-MCNC: 183 MG/DL
CO2 SERPL-SCNC: 25 MMOL/L (ref 22–31)
CREAT SERPL-MCNC: 0.92 MG/DL (ref 0.6–1.1)
ERYTHROCYTE [DISTWIDTH] IN BLOOD BY AUTOMATED COUNT: 16 % (ref 11–14.5)
FASTING STATUS PATIENT QL REPORTED: YES
GFR SERPL CREATININE-BSD FRML MDRD: >60 ML/MIN/1.73M2
GLUCOSE BLD-MCNC: 94 MG/DL (ref 70–125)
HBA1C MFR BLD: 5.4 %
HCT VFR BLD AUTO: 29.7 % (ref 35–47)
HDLC SERPL-MCNC: 45 MG/DL
HGB BLD-MCNC: 9.2 G/DL (ref 12–16)
LDLC SERPL CALC-MCNC: 115 MG/DL
MCH RBC QN AUTO: 22.4 PG (ref 27–34)
MCHC RBC AUTO-ENTMCNC: 31.2 G/DL (ref 32–36)
MCV RBC AUTO: 72 FL (ref 80–100)
PLATELET # BLD AUTO: 279 THOU/UL (ref 140–440)
PMV BLD AUTO: 9.1 FL (ref 7–10)
POTASSIUM BLD-SCNC: 4.2 MMOL/L (ref 3.5–5)
PROT SERPL-MCNC: 6.5 G/DL (ref 6–8)
RBC # BLD AUTO: 4.13 MILL/UL (ref 3.8–5.4)
SODIUM SERPL-SCNC: 139 MMOL/L (ref 136–145)
TRIGL SERPL-MCNC: 115 MG/DL
VIT B12 SERPL-MCNC: 227 PG/ML (ref 213–816)
WBC: 3.8 THOU/UL (ref 4–11)

## 2021-01-27 ASSESSMENT — MIFFLIN-ST. JEOR: SCORE: 1983.11

## 2021-01-30 ENCOUNTER — COMMUNICATION - HEALTHEAST (OUTPATIENT)
Dept: SCHEDULING | Facility: CLINIC | Age: 41
End: 2021-01-30

## 2021-02-13 ENCOUNTER — COMMUNICATION - HEALTHEAST (OUTPATIENT)
Dept: FAMILY MEDICINE | Facility: CLINIC | Age: 41
End: 2021-02-13

## 2021-02-13 DIAGNOSIS — I10 ESSENTIAL HYPERTENSION WITH GOAL BLOOD PRESSURE LESS THAN 130/80: ICD-10-CM

## 2021-02-15 RX ORDER — HYDROCHLOROTHIAZIDE 25 MG/1
25 TABLET ORAL DAILY
Qty: 90 TABLET | Refills: 2 | Status: SHIPPED | OUTPATIENT
Start: 2021-02-15 | End: 2021-11-11

## 2021-03-03 ENCOUNTER — COMMUNICATION - HEALTHEAST (OUTPATIENT)
Dept: FAMILY MEDICINE | Facility: CLINIC | Age: 41
End: 2021-03-03

## 2021-03-03 DIAGNOSIS — E28.2 PCOS (POLYCYSTIC OVARIAN SYNDROME): ICD-10-CM

## 2021-03-03 DIAGNOSIS — I10 HTN (HYPERTENSION): ICD-10-CM

## 2021-03-03 DIAGNOSIS — I10 ESSENTIAL HYPERTENSION WITH GOAL BLOOD PRESSURE LESS THAN 130/80: ICD-10-CM

## 2021-03-04 RX ORDER — LABETALOL 100 MG/1
100 TABLET, FILM COATED ORAL 2 TIMES DAILY
Qty: 180 TABLET | Refills: 3 | Status: SHIPPED | OUTPATIENT
Start: 2021-03-04 | End: 2022-02-14

## 2021-03-04 RX ORDER — METFORMIN HCL 500 MG
1000 TABLET, EXTENDED RELEASE 24 HR ORAL 2 TIMES DAILY
Qty: 360 TABLET | Refills: 3 | Status: SHIPPED | OUTPATIENT
Start: 2021-03-04 | End: 2022-02-08

## 2021-04-13 ENCOUNTER — COMMUNICATION - HEALTHEAST (OUTPATIENT)
Dept: FAMILY MEDICINE | Facility: CLINIC | Age: 41
End: 2021-04-13

## 2021-05-26 ASSESSMENT — PATIENT HEALTH QUESTIONNAIRE - PHQ9: SUM OF ALL RESPONSES TO PHQ QUESTIONS 1-9: 2

## 2021-05-29 NOTE — TELEPHONE ENCOUNTER
Refill Approved    Rx renewed per Medication Renewal Policy. Medication was last renewed on 5/21/18.    Glory Morejon, [facility] Triage/Med Refill 5/22/2019     Requested Prescriptions   Pending Prescriptions Disp Refills     labetalol (TRANDATE; NORMODYNE) 100 MG tablet [Pharmacy Med Name: LABETALOL 100MG TABLETS] 60 tablet 0     Sig: TAKE 1 TABLET(100 MG) BY MOUTH TWICE DAILY       Beta-Blockers Refill Protocol Passed - 5/21/2019  8:41 AM        Passed - PCP or prescribing provider visit in past 12 months or next 3 months     Last office visit with prescriber/PCP: 4/18/2018 Rossy Ridley MD OR same dept: Visit date not found OR same specialty: 4/18/2018 Rossy Ridley MD  Last physical: 11/5/2018 Last MTM visit: Visit date not found   Next visit within 3 mo: Visit date not found  Next physical within 3 mo: Visit date not found  Prescriber OR PCP: Rossy Ridley MD  Last diagnosis associated with med order: 1. HTN (hypertension)  - labetalol (TRANDATE; NORMODYNE) 100 MG tablet [Pharmacy Med Name: LABETALOL 100MG TABLETS]; TAKE 1 TABLET(100 MG) BY MOUTH TWICE DAILY  Dispense: 60 tablet; Refill: 0    If protocol passes may refill for 12 months if within 3 months of last provider visit (or a total of 15 months).             Passed - Blood pressure filed in past 12 months     BP Readings from Last 1 Encounters:   11/05/18 (!) 138/100

## 2021-05-30 ENCOUNTER — RECORDS - HEALTHEAST (OUTPATIENT)
Dept: ADMINISTRATIVE | Facility: CLINIC | Age: 41
End: 2021-05-30

## 2021-05-30 NOTE — TELEPHONE ENCOUNTER
RN cannot approve Refill Request    RN can NOT refill this medication overdue for office visits and/or labs.    Arcenio Cortez, [facility] Triage/Med Refill 6/27/2019    Requested Prescriptions   Pending Prescriptions Disp Refills     metFORMIN (GLUCOPHAGE-XR) 500 MG 24 hr tablet [Pharmacy Med Name: METFORMIN ER 500MG 24HR TABS] 360 tablet 0     Sig: TAKE 4 TABLETS BY MOUTH EVERY MORNING WITH BREAKFAST       Metformin Refill Protocol Failed - 6/26/2019 10:57 AM        Failed - Visit with PCP or prescribing provider visit in last 6 months or next 3 months     Last office visit with prescriber/PCP: Visit date not found OR same dept: Visit date not found OR same specialty: 4/18/2018 Rossy Ridley MD Last physical: Visit date not found Last MTM visit: Visit date not found         Next appt within 3 mo: Visit date not found  Next physical within 3 mo: Visit date not found  Prescriber OR PCP: Gillian Rajput MD  Last diagnosis associated with med order: 1. PCOS (polycystic ovarian syndrome)  - metFORMIN (GLUCOPHAGE-XR) 500 MG 24 hr tablet [Pharmacy Med Name: METFORMIN ER 500MG 24HR TABS]; TAKE 4 TABLETS BY MOUTH EVERY MORNING WITH BREAKFAST  Dispense: 360 tablet; Refill: 0     If protocol passes may refill for 12 months if within 3 months of last provider visit (or a total of 15 months).           Failed - A1C in last 6 months     Hemoglobin A1c   Date Value Ref Range Status   11/05/2018 5.1 3.5 - 6.0 % Final               Failed - Microalbumin in last year      No results found for: MICROALBUR               Passed - Blood pressure in last 12 months     BP Readings from Last 1 Encounters:   11/05/18 (!) 138/100             Passed - LFT or AST or ALT in last 12 months     Albumin   Date Value Ref Range Status   11/05/2018 4.2 3.5 - 5.0 g/dL Final     Bilirubin, Total   Date Value Ref Range Status   11/05/2018 0.7 0.0 - 1.0 mg/dL Final     Alkaline Phosphatase   Date Value Ref Range Status    11/05/2018 57 45 - 120 U/L Final     AST   Date Value Ref Range Status   11/05/2018 15 0 - 40 U/L Final     ALT   Date Value Ref Range Status   11/05/2018 14 0 - 45 U/L Final     Protein, Total   Date Value Ref Range Status   11/05/2018 7.0 6.0 - 8.0 g/dL Final                Passed - GFR or Serum Creatinine in last 6 months     GFR MDRD Non Af Amer   Date Value Ref Range Status   11/05/2018 >60 >60 mL/min/1.73m2 Final     GFR MDRD Af Amer   Date Value Ref Range Status   11/05/2018 >60 >60 mL/min/1.73m2 Final

## 2021-05-31 VITALS — WEIGHT: 257.19 LBS | HEIGHT: 67 IN | BODY MASS INDEX: 40.37 KG/M2

## 2021-05-31 VITALS — HEIGHT: 68 IN | WEIGHT: 274.2 LBS | BODY MASS INDEX: 41.56 KG/M2

## 2021-05-31 VITALS — WEIGHT: 258.8 LBS | BODY MASS INDEX: 40.53 KG/M2

## 2021-06-01 VITALS — WEIGHT: 275.2 LBS | BODY MASS INDEX: 41.84 KG/M2

## 2021-06-02 VITALS — WEIGHT: 269 LBS | BODY MASS INDEX: 40.77 KG/M2 | HEIGHT: 68 IN

## 2021-06-03 NOTE — PROGRESS NOTES
Assessment:     1. Routine general medical examination at a health care facility    2. PCOS (polycystic ovarian syndrome)    3. HTN (hypertension)    4. Excessive bleeding in premenopausal period    5. Iron deficiency anemia due to chronic blood loss    6. Diaphragmatic hernia without obstruction and without gangrene    7. Lipid screening    8. Morbid obesity (H)    9. Immunization due    10. Essential hypertension with goal blood pressure less than 130/80        Plan:      1. Routine general medical examination at a health care facility  -Routine health maintenance discussion:  No smoking, limited alcohol (7 or less servings per week), 5 fruits/veg servings per day, 200 minutes of exercise per week.  Daily calcium/vitamin D guidelines, bone health, colon cancer screening beginning at age 50.  Accident avoidance, sun screen.     2. PCOS (polycystic ovarian syndrome)  -Doing well at this time with continued effort and exercise and watching her diet.  As she struggles tolerating the metformin, we discussed that it has fallen out of favor to put people on metformin for just PCOS.  She may try cutting this dose in half to see if she tolerates it better and if anything changes from a symptom perspective.  If she notes no changes in acne, periods or hair growth she could certainly try discontinuing her medication to see if this helps.  She is doing well she will plan on following up in 1 year if she has further issues she will follow-up sooner.  - metFORMIN (GLUCOPHAGE-XR) 500 MG 24 hr tablet; TAKE 4 TABLETS BY MOUTH EVERY MORNING WITH BREAKFAST  Dispense: 360 tablet; Refill: 3  - Glycosylated Hemoglobin A1c    3. HTN (hypertension)  -Blood pressure is under good control, she will continue on her current medications, she will plan on following up in 1 year if doing well, she will call sooner if issues.  - labetalol (TRANDATE; NORMODYNE) 100 MG tablet; TAKE 1 TABLET(100 MG) BY MOUTH TWICE DAILY  Dispense: 180 tablet;  Refill: 3  - Comprehensive Metabolic Panel    4. Excessive bleeding in premenopausal period  -Discussed undergoing an ultrasound of her pelvis to make sure there is no new intrauterine abnormalities such as fibroids, discussed that this is likely related to aging but certainly if she has difficulties maintaining her iron she may want to consider consultation with OB to discuss possible endometrial ablation.  She is welcome to let me know if she would like this referral.  Updating thyroid as well today.  - US Pelvis With Transvaginal Non OB; Future  - Thyroid Cascade    5. Iron deficiency anemia due to chronic blood loss  -Has been off of iron supplements since she was last in, will update labs as listed below, and plan on following up in 1 year if doing well.  - HM2(CBC w/o Differential)  - Iron and Transferrin Iron Binding Capacity  - Ferritin    6. Diaphragmatic hernia without obstruction and without gangrene  -Doing well on omeprazole, updating magnesium level today  - Magnesium    7. Lipid screening  - Lipid Cascade    8. Morbid obesity (H)  -Continuing to work on diet and exercise    9. Immunization due  - Influenza,Seasonal,Quad,INJ =/>6months    10. Essential hypertension with goal blood pressure less than 130/80  - hydroCHLOROthiazide (HYDRODIURIL) 25 MG tablet; Take 1 tablet (25 mg total) by mouth daily.  Dispense: 90 tablet; Refill: 3       Subjective:      Karyna Hammer is a 39 y.o. female who presents for an annual exam. The patient is sexually active. The patient participates in regular exercise: no. The patient reports that there is not domestic violence in her life.     Her main concern today are her periods.  She notes that she is historically always had heavy bleeding but now her pattern has changed a bit.  She notes that the first day her bleeding is fairly heavy, the second day she has passage of numerous clots some that are somewhat large in nature and then if she is done bleeding.  She is a  history of iron deficiency anemia, has not been on iron since she was in last year.  She continues to have her menstrual period on a very regular basis.  She has had ultrasounds in the past looking for fibroids but does not have any knowledge of having any fibroids.  She does not like taking hormonal contraception, and does have the Essure tubal occlusion devices in.    She denies any issues with her blood pressure medication, continues to tolerate this without any difficulty.  She does exercise as able and watches her diet.  Her reflux related to her hiatal hernia is under very good control as well.    She otherwise feels well.     Healthy Habits:   Regular Exercise: No  Sunscreen Use: Yes  Healthy Diet: No  Dental Visits Regularly: Yes  Seat Belt: Yes  Sexually active: Yes  Self Breast Exam Monthly:No  Hemoccults: N/A  Flex Sig: N/A  Colonoscopy: N/A  Lipid Profile: Yes  Glucose Screen: Yes  Prevention of Osteoporosis: No  Last Dexa: N/A  Guns at Home:  No      Immunization History   Administered Date(s) Administered     Influenza, inj, historic,unspecified 11/06/2007     Influenza,live, Nasal Laiv4 01/29/2014     Influenza,seasonal quad, PF, =/> 6months 11/05/2018     Td,adult,historic,unspecified 1978     Tdap 11/05/2018     Immunization status: up to date and documented, missing doses of flu vaccine.    No exam data present    Gynecologic History  Patient's last menstrual period was 10/25/2019 (exact date).  Contraception: essure implants  Last Pap: 7/14/15. Results were: normal  Last mammogram: N/A. Results were: N/A      OB History   No obstetric history on file.       Current Outpatient Medications   Medication Sig Dispense Refill     aspirin-acetaminophen-caffeine (EXCEDRIN MIGRAINE) 250-250-65 mg per tablet Take 1 tablet by mouth as needed for pain.        cetirizine (ZYRTEC) 10 MG tablet Take 10 mg by mouth as needed for allergies.       ferrous sulfate 325 (65 FE) MG tablet Take 1 tablet (325 mg  total) by mouth 2 (two) times a day with meals. 60 tablet 3     hydroCHLOROthiazide (HYDRODIURIL) 25 MG tablet Take 1 tablet (25 mg total) by mouth daily. 90 tablet 0     ibuprofen (ADVIL,MOTRIN) 200 MG tablet Take 400 mg by mouth as needed for pain.        labetalol (TRANDATE; NORMODYNE) 100 MG tablet TAKE 1 TABLET(100 MG) BY MOUTH TWICE DAILY 180 tablet 1     metFORMIN (GLUCOPHAGE-XR) 500 MG 24 hr tablet TAKE 4 TABLETS BY MOUTH EVERY MORNING WITH BREAKFAST 360 tablet 0     metFORMIN (GLUCOPHAGE-XR) 500 MG 24 hr tablet TAKE 4 TABLETS BY MOUTH EVERY MORNING WITH BREAKFAST 360 tablet 1     omeprazole (PRILOSEC) 20 MG capsule Take 20 mg by mouth daily as needed.       No current facility-administered medications for this visit.      No past medical history on file.  Past Surgical History:   Procedure Laterality Date     ESSURE TUBAL LIGATION       Patient has no known allergies.  Family History   Problem Relation Age of Onset     Prostate cancer Father      Dementia Maternal Grandmother      Heart disease Maternal Grandfather      Ovarian cancer Paternal Grandmother      Dementia Paternal Grandmother      Lung cancer Paternal Grandfather      Social History     Socioeconomic History     Marital status:      Spouse name: Not on file     Number of children: Not on file     Years of education: Not on file     Highest education level: Not on file   Occupational History     Not on file   Social Needs     Financial resource strain: Not on file     Food insecurity:     Worry: Not on file     Inability: Not on file     Transportation needs:     Medical: Not on file     Non-medical: Not on file   Tobacco Use     Smoking status: Never Smoker     Smokeless tobacco: Never Used   Substance and Sexual Activity     Alcohol use: Yes     Alcohol/week: 1.0 - 2.0 standard drinks     Types: 1 - 2 Standard drinks or equivalent per week     Drug use: No     Sexual activity: Yes     Partners: Male     Birth control/protection:  "Surgical     Comment: Essure    Lifestyle     Physical activity:     Days per week: Not on file     Minutes per session: Not on file     Stress: Not on file   Relationships     Social connections:     Talks on phone: Not on file     Gets together: Not on file     Attends [employer] service: Not on file     Active member of club or organization: Not on file     Attends meetings of clubs or organizations: Not on file     Relationship status: Not on file     Intimate partner violence:     Fear of current or ex partner: Not on file     Emotionally abused: Not on file     Physically abused: Not on file     Forced sexual activity: Not on file   Other Topics Concern     Not on file   Social History Narrative     Not on file       Review of Systems  Review of Systems      With the exception of the aforementioned issues, 12 point, comprehensive ROS was done and was negative.     Objective:         Vitals:    11/13/19 1213   BP: 114/74   Pulse: 70   SpO2: 99%   Weight: (!) 268 lb 4.8 oz (121.7 kg)   Height: 5' 8\" (1.727 m)     Body mass index is 40.79 kg/m .    Physical  Physical Exam      Gen: Well developed, well nourished, no acute distress.  HEENT: normocephalic/atraumatic, PERRLA/EOMI, TMs: Gray, normal light reflex, no nasal discharge.  Oral mucosa: no erythema/exudate  Neck: No LAD/masses/thyromegaly  Lungs: clear bilaterally  Heart: regular rate and rhythm, no murmurs/gallops/rubs  Breasts: symmetric, no masses/skin changes, nipple discharge, or axillary LAD.  BSE reviewed.  Abdomen: Normal bowel sounds, soft, non-tender, non-distended, no masses, neg Bella's/McBurney's, no rebound/guarding  Genital: deferred, no complaints, pap smear not due  Lymphatics: no supraclavicular/axillary/epitrochlear/inguinal LAD. No edema.  Neuro: A&O x 3, CN II-XII intact, strength 5/5, reflexes symmetric, sensory intact to light touch.  Psych: Behavior appropriate, engaging.  Thought processes congruent, " non-tangential.  Musculoskeletal: no gross deformities.  Skin: no rashes or lesions.

## 2021-06-04 VITALS
HEIGHT: 68 IN | DIASTOLIC BLOOD PRESSURE: 74 MMHG | BODY MASS INDEX: 40.66 KG/M2 | SYSTOLIC BLOOD PRESSURE: 114 MMHG | OXYGEN SATURATION: 99 % | WEIGHT: 268.3 LBS | HEART RATE: 70 BPM

## 2021-06-05 VITALS
SYSTOLIC BLOOD PRESSURE: 118 MMHG | WEIGHT: 281 LBS | OXYGEN SATURATION: 99 % | DIASTOLIC BLOOD PRESSURE: 88 MMHG | HEIGHT: 68 IN | HEART RATE: 65 BPM | BODY MASS INDEX: 42.59 KG/M2

## 2021-06-05 NOTE — TELEPHONE ENCOUNTER
Refill Approved    Rx renewed per Medication Renewal Policy. Medication was last renewed on 11/13/19.    Belia Siddiqi, [facility] Triage/Med Refill 1/31/2020     Requested Prescriptions   Pending Prescriptions Disp Refills     hydroCHLOROthiazide (HYDRODIURIL) 25 MG tablet [Pharmacy Med Name: HYDROCHLOROTHIAZIDE 25MG TABLETS] 90 tablet 3     Sig: TAKE 1 TABLET(25 MG) BY MOUTH DAILY       Diuretics/Combination Diuretics Refill Protocol  Passed - 1/30/2020 11:42 AM        Passed - Visit with PCP or prescribing provider visit in past 12 months     Last office visit with prescriber/PCP: 4/18/2018 Rossy Ridley MD OR same dept: Visit date not found OR same specialty: 4/18/2018 Rossy Ridley MD  Last physical: 11/13/2019 Last MTM visit: Visit date not found   Next visit within 3 mo: Visit date not found  Next physical within 3 mo: Visit date not found  Prescriber OR PCP: Rossy Ridley MD  Last diagnosis associated with med order: 1. Essential hypertension with goal blood pressure less than 130/80  - hydroCHLOROthiazide (HYDRODIURIL) 25 MG tablet [Pharmacy Med Name: HYDROCHLOROTHIAZIDE 25MG TABLETS]; TAKE 1 TABLET(25 MG) BY MOUTH DAILY  Dispense: 90 tablet; Refill: 3    If protocol passes may refill for 12 months if within 3 months of last provider visit (or a total of 15 months).             Passed - Serum Potassium in past 12 months      Lab Results   Component Value Date    Potassium 4.1 11/13/2019             Passed - Serum Sodium in past 12 months      Lab Results   Component Value Date    Sodium 139 11/13/2019             Passed - Blood pressure on file in past 12 months     BP Readings from Last 1 Encounters:   11/13/19 114/74             Passed - Serum Creatinine in past 12 months      Creatinine   Date Value Ref Range Status   11/13/2019 0.82 0.60 - 1.10 mg/dL Final

## 2021-06-06 NOTE — PATIENT INSTRUCTIONS - HE
You are being tested for Corona virus     We will call you with your results.    Isolate Yourself:    Isolate yourself while traveling.    Do Not allow any visitors within 6 feet.    Do Not go to work or school.    Do Not go to Denominational,  centers, shopping, or other public places.    Do Not shake hands.    Avoid close contact with others (hugging, kissing).    Protect Others:    Cover Your Mouth and Nose with a mask, disposable tissue or wash cloth to avoid spreading germs to others.    Wash your hands and face frequently with soap and water    Call Back If: Breathing difficulty develops or you become worse.    For more information about COVID19 and options for caring for yourself at home, please visit the CDC website at https://www.cdc.gov/coronavirus/2019-ncov/about/steps-when-sick.html  For more options for care at [facility], please visit our website at [email or web address]

## 2021-06-06 NOTE — PROGRESS NOTES
SUBJECTIVE: Here for curbside evaluation for coronaviruse.    Reports no new symptoms.     OBJECTIVE: no apparent distress  Eyes appear normal  Mucous membranes moist  Non diaphoretic.   No increased work of breathing   Mental status appears normal/affect normal     1. Cough  Coronavirus SARS-CoV-2 by PCR    over the counter meds and isolation discussed until results in.   Education information given. Time of visit was 15 minutes more than half in coordination of care and counseling regarding COVID and self-isolation

## 2021-06-07 NOTE — TELEPHONE ENCOUNTER
Discussed with patient over the phone.  Almost 2 weeks of vague intermittent symptoms.  Occasional temperature of 99.5-100.  Occasional body aches with the fever.  Emesis episode about 4 days ago.  No rash.  Denies chest pain.  No new diaphoresis.  Appetite is slightly down, but no persistent nausea.  No oral lesions.  No travel to wooded areas.  No urinary symptoms.  No diarrhea.  No significant family history of cardiac disease or sudden death.  She does note that she did get a crown placed just prior to the symptoms.  Noted negative testing for COVID-19.    No red flags based on what she is telling me.  See if symptoms improve over the weekend.  If by Tuesday, no improvement, try broad-spectrum antibiotic.    Ricardo Post, CNP

## 2021-06-07 NOTE — PROGRESS NOTES
Coronavirus (COVID-19) Notification  Patient states she already is aware of negative results and printed off the copy without contact from clinic.     Patient notified of Negative COVID-19.    Patient can discontinue Quarantee and is free to resume normal activities.  If Patient has questions that you are not able to answer they can contact PCP or MD hotline ([phone])    Please Contact your PCP clinic or return to the Emergency department if your:  Symptoms worsen or other concerning symptom's. Patient still having symptoms.  States she is working with Dr Ridley about these symptoms. Did not want to transfer to triage.  Patient asked if she needed to remain on Quarantee in her home while she is having symptoms  Writer shared yes needs to stay in her own home but no longer restricted to Quarantee in her own home. Was able to print off results on the [employer]. .

## 2021-06-07 NOTE — TELEPHONE ENCOUNTER
"ALEXIS Pabon calling.  Says she is a PA at [facility].  She is not able to see patient's COVID 19 test results because she only has access to [facility].  Asking to check on status of her COVID 19 results.    Advised Rober that COVID 19 results from 3/16/20 are \"in process\" according to her chart.  She says she will notify patient of this.    No further questions at this time.    Gaye Arthur RN  Triage Nurse Advisor    Reason for Disposition    Caller requesting lab results    Protocols used: PCP CALL - NO TRIAGE-A-AH      "

## 2021-06-07 NOTE — TELEPHONE ENCOUNTER
Patient reports she took COVID-19 test on 3/16- went to [facility]- patient has not received her results yet.     Writer called the clinic, who said the results were still in process. Clinic gave me MD number to give to patient to inquire about her sample.     Patient states she tried calling [facility] and they don't have a sample with her name on it. Patient reports there was a [facility] article showing there were missing samples.     Advised patient to call [facility] lab tomorrow when they open to ask about sample.     Patient verbalized understanding and had no further questions.    Juliane Jenkins, RN/M [facility] Nurse Advisors    Reason for Disposition    General information question, no triage required and triager able to answer question    Protocols used: INFORMATION ONLY CALL-A-

## 2021-06-11 NOTE — PROGRESS NOTES
ASSESSMENT/PLAN:       1. Essential hypertension with goal blood pressure less than 130/80  -We discussed the options including increasing her metoprolol versus the addition of another medication.  Since she is going on an airplane and on a trip soon and hesitant to place her on a diuretic.  She is no longer planning on any children so we did discuss the risks and benefits of lisinopril and she is comfortable adding this.  We discussed that we will try to maximize the dose of the lisinopril and perhaps be able to get her off of the metoprolol.  I am hesitant to increase her metoprolol today as well given her heart rate and the risk of bradycardia.  She will check her blood pressures while she is on her trip and plan on following up here in approximately 4 weeks for recheck and lab work.  -She will continue to watch her salt intake as well.  - lisinopril (PRINIVIL,ZESTRIL) 20 MG tablet; Take 1 tablet (20 mg total) by mouth daily.  Dispense: 30 tablet; Refill: 1    2. Iron Deficiency Anemia  -The patient and I discussed the most likely etiologies for her bleeding.  The hemogram suggests iron deficiency given her low MCV.  She is concerned about her hiatal hernia contributing to iron deficiency anemia however we discussed that since we have a known source of blood loss with heavy periods with clots this is most likely to be the source.  I have asked her to follow-up with her OB again to discuss methods to help control her bleeding.  Since she has the Essure I wonder if she would be a good candidate for an endometrial ablation.  We discussed IUDs and she is uninterested in this and I discussed with her that I do not know the implications of using an IUD with the coils in place.  Lastly we talked about oral contraceptive pills but again given the fact that she has the coils would be nice for her not to have to take another pill a day particularly with the risk given her obesity and age.      Rossy Ridley,  MD      PROGRESS NOTE   6/13/2017    SUBJECTIVE:  Karyna Hammer is a 36 y.o. female  who presents for follow up.     The patient has had blood pressure issues for some time.  Historically she has been on labetalol but had not been in here to be seen in quite some time.  She ran out of her medication and then needed an appt to get more and didn't get to that. She did stop it, and was feeling ok and then started feeling off, started getting headaches so then realize she needed to be on the medication.  She called restarted medication and then was in for follow-up the next day and was switched to metoprolol.  She is not sure why this happened.  She has been on labetalol for years.    Things are back to normal from an eating prespective. Exercise was happening for a while, then her life got crazy in feb and hasn't been since then. She does have the Essure, is not planning on any more children. She does take her BP at home and there have been nights where it has been 215/115. She was on bedrest for 3 pregnancies and her blood pressure has never been that high.     She does have anemia as well, does have menstrual clots. Does have the essure. Does not like the idea of an IUD. She has always had heavy periods. She is not taking iron, but is eating high iron foods. She wants to know how I would be confident that this is due to menstrual blood loss and iron deficiency and not something like B12 deficiency.   Chief Complaint   Patient presents with     Follow-up     Patient was seen at  [facility] on 6/8 for fatigue, shortness of breath, intermittent chest pain and a headache. Patient is still having elevated blood pressure and headaches.          Patient Active Problem List   Diagnosis     Hypertension     Hiatal Hernia     Shortness Of Breath     PCOS (polycystic ovarian syndrome)     Anxiety       Current Outpatient Prescriptions   Medication Sig Dispense Refill     aspirin-acetaminophen-caffeine (EXCEDRIN MIGRAINE) 250-250-65  mg per tablet Take 1 tablet by mouth every 6 (six) hours as needed for pain.       escitalopram oxalate (LEXAPRO) 10 MG tablet Take 1 tablet (10 mg total) by mouth daily. 30 tablet 2     ibuprofen (ADVIL,MOTRIN) 200 MG tablet Take 400 mg by mouth every 6 (six) hours as needed for pain.       metFORMIN (GLUCOPHAGE-XR) 500 MG 24 hr tablet Take 1,000 mg by mouth daily with breakfast.       metoprolol succinate (TOPROL-XL) 100 MG 24 hr tablet Take 100 mg by mouth daily.       omeprazole (PRILOSEC) 20 MG capsule Take 20 mg by mouth daily as needed.       lisinopril (PRINIVIL,ZESTRIL) 20 MG tablet Take 1 tablet (20 mg total) by mouth daily. 30 tablet 1     No current facility-administered medications for this visit.        History   Smoking Status     Former Smoker   Smokeless Tobacco     Not on file           OBJECTIVE:        No results found for this or any previous visit (from the past 240 hour(s)).    Vitals:    06/13/17 1552 06/13/17 1600 06/13/17 1612   BP: (!) 156/114 (!) 160/110 (!) 190/98   Patient Site: Right Arm Right Arm    Patient Position: Sitting Sitting    Cuff Size: Adult Large Adult Large    Pulse: 70     SpO2: 100%     Weight: (!) 258 lb 12.8 oz (117.4 kg)       Weight: 258 lb 12.8 oz (117.4 kg)        Physical Exam:  GENERAL APPEARANCE: A&A, NAD, well hydrated, well nourished  SKIN:  Normal skin turgor, no lesions/rashes   HEENT: moist mucous membranes, no rhinorrhea  NECK: Normal  CV: RRR, no M/G/R   LUNGS: CTAB  EXTREMITY: no edema   NEURO: no gross deficits   Psych: Her affect is anxious, her eye contact is normal, her thought process and speech pattern are normal, no obvious hallucinations

## 2021-06-11 NOTE — PROGRESS NOTES
Assessment:       1. Essential hypertension     2. Anxiety            Plan:        Blood pressure is under poor control. We reviewed her current medications and we will change to metoprolol  mg daily. We discussed starting HCTZ as well and she prefers to start with the metoprolol alone at this time. We reviewed dietary recommendations, including low salt and high fiber diet, and recommendations for regular exercise/activity. We discussed indications for further evaluation and she will follow up with terry next month. Recommended that she consider BP check (nurse visit) sooner if consistently high. As far as her anxiety, we reviewed treatment options and she is agreeable to re-starting lexapro generic at this time. We reviewed potential side effects at length, including dry mouth and GI upset, and she will call or return to clinic with any significant difficulties. Again, she will follow up with Dr Ridley in 1 month of so.       Subjective:      Fasting today? No  Hypertension      Patient is here for follow-up of elevated blood pressure. She has a history of hypertension with pregnancy and has been on labetolol since then. She stopped it 6 weeks ago when her Rx ran out, and has had some headaches and dizziness recently. She has had some marital issues and increased anxiety recently and had a panic attack prompting her to check her BP and it was quite high. She resumed her medication and it has come down but she wonders if she should be on something different. Associated signs and symptoms: headache, tiredness/fatigue and dizziness. Denies chest pain, dyspnea and peripheral edema. Weight trend: stable.  She is currently taking labetolol. Current side effects include: ?shakiness.       Karyna also presents for evaluation of anxiety. She has the following anxiety symptoms: feelings of losing control, panic attacks and racing thoughts. Onset of symptoms was approximately a few months ago. Symptoms have been  "rapidly worsening since that time. Current symptoms include depressed mood, insomnia, hypersomnia, fatigue, chest pain, difficulty concentrating, dizziness, feelings of losing control, irritable, panic attacks, racing thoughts and inability to relax. Patient denies feelings of worthlessness/guilt, hopelessness and recurrent thoughts of death . She denies current suicidal and homicidal ideation.       Risk factors: negative life event: marital issues. Previous treatment includes Lexapro briefly. She complains of the following medication side effects: none.      PHQ9 = 12  GAD7 =  17      Review of Systems  Pertinent items are noted in HPI.   she is s/p essure procedure for contraception.     Objective:       BP (!) 162/100  Pulse 80  Ht 5' 7\" (1.702 m)  Wt (!) 257 lb 3 oz (116.7 kg)  BMI 40.28 kg/m2  GEN: Alert and oriented, NAD, well nourished  SKIN:  Normal skin turgor, no lesions/rashes   HEENT: NC/AT.    NECK: Normal.    CV: Regular rate and rhythm  LUNGS: Normal respirations    EXTREMITY: No edema, cyanosis  NEURO: Grossly normal.       "

## 2021-06-12 NOTE — PROGRESS NOTES
Assessment:   The encounter diagnosis was Eye pain, bilateral.     Plan:   No medications were ordered this encounter    Patient Instructions     Based on the information provided, I believe you need to be seen immediately.  Please go to urgent care as soon as possible.    You will not be charged for this eVisit.    No Follow-up on file.    Subjective:   Karyna Hammer is a 37 y.o. female who submitted an eVisit request for evaluation of her Conjunctivitis.  See the questionnaire and message section of encounter report for information related to history of present illness and review of systems.    The following portions of the patient's history were reviewed and updated as appropriate:  She  does not have any pertinent problems on file.  She has No Known Allergies..     Objective:   No exam performed today, patient submitted as eVisit.

## 2021-06-13 NOTE — PROGRESS NOTES
Assessment:     1. Routine general medical examination at a health care facility    2. Essential hypertension with goal blood pressure less than 130/80    3. PCOS (polycystic ovarian syndrome)    4. Iron deficiency anemia    5. Weight gain         Plan:     1. Routine general medical examination at a health care facility  -Routine health maintenance discussion:  No smoking, limited alcohol (7 or less servings per week), 5 fruits/veg servings per day, 200 minutes of exercise per week.  Daily calcium/vitamin D guidelines, bone health, colon cancer screening beginning at age 50.  Accident avoidance, sun screen.   - Lipid Cascade    2. Essential hypertension with goal blood pressure less than 130/80  -BP is at goal at this time with her current medications. Continue to work on exercise, low salt diet and f/u here in six months.   - Comprehensive Metabolic Panel  - hydroCHLOROthiazide (HYDRODIURIL) 25 MG tablet; Take 1 tablet (25 mg total) by mouth daily.  Dispense: 90 tablet; Refill: 3    3. PCOS (polycystic ovarian syndrome)  -She is on metformin. Continue on this. Consider starting spironolactone as well for her acne, hirsutism and weight gain if her labs are all normal. She will let me know.      4. Iron deficiency anemia  -Will update today.   - HM2(CBC w/o Differential)    5. Weight gain  -Unsure of the etiology. Will check her thyroid and will work on diet and exercise. Continue on spironolactone  - Thyroid Cascade        Subjective:      Karyna Hammer is a 37 y.o. female who presents for an annual exam. The patient is sexually active. The patient participates in regular exercise: no. The patient reports that there is not domestic violence in her life.     She is doing well with lebetalol as well as the hctz. She did gain some weight with the metoprolol however. Up nearly 20 pounds since June. No change in lifestyle, eating habits etc. Her stress was increased, Feb-June/July. Has been better now. She is sleeping  fine. She does watch her salt intake as well. She continues on the metformin as well.     Sees OB for breast and pelvic exam.        Healthy Habits:   Regular Exercise: No  Sunscreen Use: Yes  Healthy Diet: Yes  Dental Visits Regularly: No  Seat Belt: Yes  Sexually active: Yes  Self Breast Exam Monthly:No  Hemoccults: No  Flex Sig: No  Colonoscopy: No  Lipid Profile: Yes  Glucose Screen: Yes  Prevention of Osteoporosis: No  Last Dexa: No  Guns at Home:  No      Immunization History   Administered Date(s) Administered     Influenza, inj, historic 11/06/2007     Influenza,live, Nasal Laiv4 01/29/2014     Td, historic 1978     Immunization status: up to date and documented, missing doses of Tdap and flu.    No exam data present    Gynecologic History  Patient's last menstrual period was 09/04/2017.  Contraception: Essure  Last Pap: 07/14/15. Results were: normal  Last mammogram: N/A Results were: N/A      OB History   No data available       Current Outpatient Prescriptions   Medication Sig Dispense Refill     aspirin-acetaminophen-caffeine (EXCEDRIN MIGRAINE) 250-250-65 mg per tablet Take 1 tablet by mouth as needed for pain.        hydroCHLOROthiazide (HYDRODIURIL) 25 MG tablet Take 1 tablet (25 mg total) by mouth daily. 30 tablet 1     ibuprofen (ADVIL,MOTRIN) 200 MG tablet Take 400 mg by mouth as needed for pain.        labetalol (TRANDATE; NORMODYNE) 100 MG tablet Take 1 tablet (100 mg total) by mouth 2 (two) times a day. 60 tablet 1     metFORMIN (GLUCOPHAGE-XR) 500 MG 24 hr tablet TAKE 4 TABLETS BY MOUTH EVERY MORNING WITH BREAKFAST 360 tablet 0     omeprazole (PRILOSEC) 20 MG capsule Take 20 mg by mouth daily as needed.       lisinopril (PRINIVIL,ZESTRIL) 20 MG tablet Take 1 tablet (20 mg total) by mouth daily. 30 tablet 1     metFORMIN (GLUCOPHAGE-XR) 500 MG 24 hr tablet Take 1,000 mg by mouth daily with breakfast.       metoprolol succinate (TOPROL-XL) 100 MG 24 hr tablet Take 100 mg by mouth daily.    "    No current facility-administered medications for this visit.      No past medical history on file.  Past Surgical History:   Procedure Laterality Date     ESSURE TUBAL LIGATION       Review of patient's allergies indicates no known allergies.  No family history on file.  Social History     Social History     Marital status:      Spouse name: N/A     Number of children: N/A     Years of education: N/A     Occupational History     Not on file.     Social History Main Topics     Smoking status: Former Smoker     Smokeless tobacco: Never Used     Alcohol use Yes     Drug use: No     Sexual activity: Yes     Partners: Male     Other Topics Concern     Not on file     Social History Narrative       Review of Systems  Review of Systems   With the exception of the aforementioned issues, 12 point, comprehensive ROS was done and was negative.      Objective:         Vitals:    09/25/17 1426   BP: 120/88   Pulse: 68   SpO2: 100%   Weight: (!) 274 lb 3.2 oz (124.4 kg)   Height: 5' 8\" (1.727 m)     Body mass index is 41.69 kg/(m^2).    Physical  Physical Exam   Gen: Well developed, well nourished, no acute distress.  HEENT: normocephalic/atraumatic, PERRLA/EOMI, TMs: Gray, normal light reflex, no nasal discharge.  Oral mucosa: no erythema/exudate  Neck: No LAD/masses/thyromegaly  Lungs: clear bilaterally  Heart: regular rate and rhythm, no murmurs/gallops/rubs  Breasts: Deferred, done at OB  Abdomen: Normal bowel sounds, soft, non-tender, non-distended, no masses, neg Bella's/McBurney's, no rebound/guarding  Genital: Deferred, done at OB  Lymphatics: no supraclavicular/axillary/epitrochlear/inguinal LAD. No edema.  Neuro: A&O x 3, CN II-XII intact, strength 5/5, reflexes symmetric, sensory intact to light touch.  Psych: Behavior appropriate, engaging.  Thought processes congruent, non-tangential.  Musculoskeletal: no gross deformities.  Skin: no rashes or lesions.      "

## 2021-06-13 NOTE — TELEPHONE ENCOUNTER
Please call patient, I received a refill request for her Metformin. I sent  In a small prescription for her, but we should look at doing a visit for her. She is welcome to schedule a physical/medication check if she is comfortable with that and we can do the labs then otherwise, she is welcome to do a virtual visit with me for a medication check and we can coordinate labs after that.

## 2021-06-13 NOTE — TELEPHONE ENCOUNTER
RN cannot approve Refill Request    RN can NOT refill this medication Protocol failed and NO refill given. Last office visit: 4/18/2018 Rossy Ridley MD Last Physical: 11/13/2019 Last MTM visit: Visit date not found Last visit same specialty: 4/18/2018 Rossy Ridley MD.  Next visit within 3 mo: Visit date not found  Next physical within 3 mo: Visit date not found      Glory Morejon, [facility] Triage/Med Refill 12/9/2020    Requested Prescriptions   Pending Prescriptions Disp Refills     metFORMIN (GLUCOPHAGE-XR) 500 MG 24 hr tablet [Pharmacy Med Name: METFORMIN ER 500MG 24HR TABS] 360 tablet 3     Sig: TAKE 4 TABLETS BY MOUTH EVERY MORNING WITH BREAKFAST       Metformin Refill Protocol Failed - 12/8/2020  9:54 AM        Failed - Blood pressure in last 12 months     BP Readings from Last 1 Encounters:   11/13/19 114/74             Failed - LFT or AST or ALT in last 12 months     Albumin   Date Value Ref Range Status   11/13/2019 4.3 3.5 - 5.0 g/dL Final     Bilirubin, Total   Date Value Ref Range Status   11/13/2019 0.7 0.0 - 1.0 mg/dL Final     Alkaline Phosphatase   Date Value Ref Range Status   11/13/2019 51 45 - 120 U/L Final     AST   Date Value Ref Range Status   11/13/2019 19 0 - 40 U/L Final     ALT   Date Value Ref Range Status   11/13/2019 20 0 - 45 U/L Final     Protein, Total   Date Value Ref Range Status   11/13/2019 6.9 6.0 - 8.0 g/dL Final                Failed - GFR or Serum Creatinine in last 6 months     GFR MDRD Non Af Amer   Date Value Ref Range Status   11/13/2019 >60 >60 mL/min/1.73m2 Final     GFR MDRD Af Amer   Date Value Ref Range Status   11/13/2019 >60 >60 mL/min/1.73m2 Final             Failed - Visit with PCP or prescribing provider visit in last 6 months or next 3 months     Last office visit with prescriber/PCP: Visit date not found OR same dept: Visit date not found OR same specialty: 4/18/2018 Rossy Ridley MD Last physical: Visit date  not found Last MTM visit: Visit date not found         Next appt within 3 mo: Visit date not found  Next physical within 3 mo: Visit date not found  Prescriber OR PCP: Rossy Ridley MD  Last diagnosis associated with med order: 1. PCOS (polycystic ovarian syndrome)  - metFORMIN (GLUCOPHAGE-XR) 500 MG 24 hr tablet [Pharmacy Med Name: METFORMIN ER 500MG 24HR TABS]; TAKE 4 TABLETS BY MOUTH EVERY MORNING WITH BREAKFAST  Dispense: 360 tablet; Refill: 3     If protocol passes may refill for 12 months if within 3 months of last provider visit (or a total of 15 months).           Failed - A1C in last 6 months     Hemoglobin A1c   Date Value Ref Range Status   11/13/2019 4.9 3.5 - 6.0 % Final               Failed - Microalbumin in last year      No results found for: MICROALBUR

## 2021-06-14 NOTE — TELEPHONE ENCOUNTER
RN cannot approve Refill Request    RN can NOT refill this medication PCP messaged that patient is overdue for Office Visit and BP. Last office visit: 4/18/2018 Rossy Ridley MD Last Physical: 11/13/2019 Last MTM visit: Visit date not found Last visit same specialty: 4/18/2018 Rossy Ridley MD.  Next visit within 3 mo: Visit date not found  Next physical within 3 mo: Visit date not found      Mimi Montanez, [facility] Triage/Med Refill 1/14/2021    Requested Prescriptions   Pending Prescriptions Disp Refills     labetaloL (TRANDATE; NORMODYNE) 100 MG tablet [Pharmacy Med Name: LABETALOL 100MG TABLETS] 180 tablet 3     Sig: TAKE 1 TABLET(100 MG) BY MOUTH TWICE DAILY       Beta-Blockers Refill Protocol Failed - 1/14/2021 11:43 AM        Failed - PCP or prescribing provider visit in past 12 months or next 3 months     Last office visit with prescriber/PCP: 4/18/2018 Rossy Ridley MD OR same dept: Visit date not found OR same specialty: 4/18/2018 Rossy Ridley MD  Last physical: 11/13/2019 Last MTM visit: Visit date not found   Next visit within 3 mo: Visit date not found  Next physical within 3 mo: Visit date not found  Prescriber OR PCP: Rossy Ridley MD  Last diagnosis associated with med order: 1. HTN (hypertension)  - labetaloL (TRANDATE; NORMODYNE) 100 MG tablet [Pharmacy Med Name: LABETALOL 100MG TABLETS]; TAKE 1 TABLET(100 MG) BY MOUTH TWICE DAILY  Dispense: 180 tablet; Refill: 3    If protocol passes may refill for 12 months if within 3 months of last provider visit (or a total of 15 months).             Failed - Blood pressure filed in past 12 months     BP Readings from Last 1 Encounters:   11/13/19 114/74

## 2021-06-14 NOTE — PROGRESS NOTES
"  Assessment & Plan     HTN (hypertension)  -Blood pressure is under good control at this time.  She is tolerating medications without difficulty.  Updating prescriptions today, updating lab work as well and if doing well plan on following up in 1 year.  - labetaloL (TRANDATE; NORMODYNE) 100 MG tablet  Dispense: 180 tablet; Refill: 3    PCOS (polycystic ovarian syndrome)  -Is having significant stomach upset with the Metformin at the maximum dosage.  I will have her trial switching to 1000 mg in the morning and 1000 mg in the evening.  If this does not help or work she will let me know and we can consider going down to 1500 mg total daily and if that is a milligrams total daily.  If she still having issues she may do better with one of the injectable medications such as Trulicity as this may help with her weight loss as well.  - metFORMIN (GLUCOPHAGE-XR) 500 MG 24 hr tablet  Dispense: 360 tablet; Refill: 3  - Glycosylated Hemoglobin A1c  - Comprehensive Metabolic Panel  - Vitamin B12    History of COVID-19  -Did have COVID in September.  Updating antibody levels today  - COVID-19 Virus (Coronavirus) Antibody & Titer Reflex  - COVID-19 Virus (Coronavirus) Antibody & Titer Reflex    Iron deficiency anemia, unspecified iron deficiency anemia type  -Has had iron deficiency previously, will update labs today  - HM2(CBC w/o Differential)    Lipid screening  - Lipid [place] FASTING    21 minutes spent on the date of the encounter doing chart review, history and exam, documentation and further activities as noted above         BMI:   Estimated body mass index is 42.73 kg/m  as calculated from the following:    Height as of this encounter: 5' 8\" (1.727 m).    Weight as of this encounter: 281 lb (127.5 kg).   The following high BMI interventions were performed this visit: encouragement to exercise and weight monitoring      No follow-ups on file.    Rossy Ridley MD  [facility] " "JAMEE Hammer is 40 y.o. and presents to clinic today for the following health issues   HPI   She is doing well. She has been off of metformin for about 6 weeks. She was doing well on it but was having a lot of diarrhea with the medication. She is not having issues with her blood pressure medication. She is not exercising super well.     She is watching her salt intake.     She notes that when she was on the Metformin she was having regular periods which was exciting for her.  She was diagnosed with insulin resistance and PCOS at the [facility] about 10 years ago.      Review of Systems  Per above      Objective    /88   Pulse 65   Ht 5' 8\" (1.727 m)   Wt (!) 281 lb (127.5 kg)   LMP 01/25/2021 (Exact Date)   SpO2 99%   Breastfeeding No   BMI 42.73 kg/m    Body mass index is 42.73 kg/m .  Physical Exam  GENERAL APPEARANCE: A&A, NAD, well hydrated, well nourished  SKIN:  Normal skin turgor, no lesions/rashes   NECK: No LAD  CV: RRR, no M/G/R   LUNGS: CTAB  ABDOMEN: S&NT, no masses   EXTREMITY: no edema   NEURO: no gross deficits   PSYCH: normal affect                    "

## 2021-06-14 NOTE — TELEPHONE ENCOUNTER
RN cannot approve Refill Request    RN can NOT refill this medication PCP messaged that patient is overdue for Office Visit and BP. Last office visit: 4/18/2018 Rossy Ridley MD Last Physical: 11/13/2019 Last MTM visit: Visit date not found Last visit same specialty: 4/18/2018 Rossy Ridley MD.  Next visit within 3 mo: Visit date not found  Next physical within 3 mo: Visit date not found      Mimi Montanez, [facility] Triage/Med Refill 1/14/2021    Requested Prescriptions   Pending Prescriptions Disp Refills     labetaloL (TRANDATE; NORMODYNE) 100 MG tablet [Pharmacy Med Name: LABETALOL 100MG TABLETS] 180 tablet 3     Sig: TAKE 1 TABLET(100 MG) BY MOUTH TWICE DAILY       Beta-Blockers Refill Protocol Failed - 1/14/2021  1:07 PM        Failed - PCP or prescribing provider visit in past 12 months or next 3 months     Last office visit with prescriber/PCP: 4/18/2018 Rossy Ridley MD OR same dept: Visit date not found OR same specialty: 4/18/2018 Rossy Ridley MD  Last physical: 11/13/2019 Last MTM visit: Visit date not found   Next visit within 3 mo: Visit date not found  Next physical within 3 mo: Visit date not found  Prescriber OR PCP: Rossy Ridley MD  Last diagnosis associated with med order: 1. HTN (hypertension)  - labetaloL (TRANDATE; NORMODYNE) 100 MG tablet [Pharmacy Med Name: LABETALOL 100MG TABLETS]; TAKE 1 TABLET(100 MG) BY MOUTH TWICE DAILY  Dispense: 180 tablet; Refill: 3    If protocol passes may refill for 12 months if within 3 months of last provider visit (or a total of 15 months).             Failed - Blood pressure filed in past 12 months     BP Readings from Last 1 Encounters:   11/13/19 114/74

## 2021-06-15 NOTE — TELEPHONE ENCOUNTER
Change in pharmacy. Routing to refill pool.    Mimi Montanez RN, BSN Nurse Triage Advisor 11:59 AM 3/3/2021

## 2021-06-15 NOTE — TELEPHONE ENCOUNTER
Medication: hctz  Last Date Filled: 1/31/20  Last appointment addressing medication: 1/27/21  Last B/P:  BP Readings from Last 3 Encounters:   01/27/21 118/88   11/13/19 114/74   11/05/18 (!) 138/100     Last labs pertaining to refill:1/27/21      Pend medication and associate diagnosis before routing to Provider for review.       If patient has not been seen in over 1 year, pend 30 day supply and notify patient they are due for an appointment before any further refills.

## 2021-06-15 NOTE — TELEPHONE ENCOUNTER
Fax received from [facility] in [place] requesting prescription for Labetalol and metformin to be sent. Patient no longer using [facility] in [place].

## 2021-06-15 NOTE — TELEPHONE ENCOUNTER
RN cannot approve Refill Request    RN can NOT refill this medication PCP messaged that patient is overdue for Labs. Last office visit: 1/27/2021 Rossy Ridley MD Last Physical: 11/13/2019 Last MTM visit: Visit date not found Last visit same specialty: 1/27/2021 Rossy Ridley MD.  Next visit within 3 mo: Visit date not found  Next physical within 3 mo: Visit date not found      Cecelia Nuñez, [facility] Triage/Med Refill 3/3/2021    Requested Prescriptions   Pending Prescriptions Disp Refills     metFORMIN (GLUCOPHAGE-XR) 500 MG 24 hr tablet 360 tablet 3     Sig: Take 2 tablets (1,000 mg total) by mouth 2 (two) times a day.       Metformin Refill Protocol Failed - 3/3/2021 11:59 AM        Failed - Microalbumin in last year      No results found for: MICROALBUR               Passed - Blood pressure in last 12 months     BP Readings from Last 1 Encounters:   01/27/21 118/88             Passed - LFT or AST or ALT in last 12 months     Albumin   Date Value Ref Range Status   01/27/2021 3.9 3.5 - 5.0 g/dL Final     Bilirubin, Total   Date Value Ref Range Status   01/27/2021 0.7 0.0 - 1.0 mg/dL Final     Alkaline Phosphatase   Date Value Ref Range Status   01/27/2021 50 45 - 120 U/L Final     AST   Date Value Ref Range Status   01/27/2021 16 0 - 40 U/L Final     ALT   Date Value Ref Range Status   01/27/2021 13 0 - 45 U/L Final     Protein, Total   Date Value Ref Range Status   01/27/2021 6.5 6.0 - 8.0 g/dL Final                Passed - GFR or Serum Creatinine in last 6 months     GFR MDRD Non Af Amer   Date Value Ref Range Status   01/27/2021 >60 >60 mL/min/1.73m2 Final     GFR MDRD Af Amer   Date Value Ref Range Status   01/27/2021 >60 >60 mL/min/1.73m2 Final             Passed - Visit with PCP or prescribing provider visit in last 6 months or next 3 months     Last office visit with prescriber/PCP: 1/27/2021 OR same dept: 1/27/2021 Rossy Ridley MD OR same specialty:  1/27/2021 Rossy Ridley MD Last physical: Visit date not found Last MTM visit: Visit date not found         Next appt within 3 mo: Visit date not found  Next physical within 3 mo: Visit date not found  Prescriber OR PCP: Rossy Ridley MD  Last diagnosis associated with med order: 1. Essential hypertension with goal blood pressure less than 130/80    2. PCOS (polycystic ovarian syndrome)  - metFORMIN (GLUCOPHAGE-XR) 500 MG 24 hr tablet; Take 2 tablets (1,000 mg total) by mouth 2 (two) times a day.  Dispense: 360 tablet; Refill: 3    3. HTN (hypertension)  - labetaloL (TRANDATE; NORMODYNE) 100 MG tablet; Take 1 tablet (100 mg total) by mouth 2 (two) times a day.  Dispense: 180 tablet; Refill: 3     If protocol passes may refill for 12 months if within 3 months of last provider visit (or a total of 15 months).           Passed - A1C in last 6 months     Hemoglobin A1c   Date Value Ref Range Status   01/27/2021 5.4 <=5.6 % Final                  labetaloL (TRANDATE; NORMODYNE) 100 MG tablet 180 tablet 3     Sig: Take 1 tablet (100 mg total) by mouth 2 (two) times a day.       Beta-Blockers Refill Protocol Passed - 3/3/2021 11:59 AM        Passed - PCP or prescribing provider visit in past 12 months or next 3 months     Last office visit with prescriber/PCP: 1/27/2021 Rossy Ridley MD OR same dept: 1/27/2021 Rossy Ridley MD OR same specialty: 1/27/2021 Rossy Ridley MD  Last physical: 11/13/2019 Last MTM visit: Visit date not found   Next visit within 3 mo: Visit date not found  Next physical within 3 mo: Visit date not found  Prescriber OR PCP: Rossy Ridley MD  Last diagnosis associated with med order: 1. Essential hypertension with goal blood pressure less than 130/80    2. PCOS (polycystic ovarian syndrome)  - metFORMIN (GLUCOPHAGE-XR) 500 MG 24 hr tablet; Take 2 tablets (1,000 mg total) by mouth 2 (two) times a day.  Dispense: 360  tablet; Refill: 3    3. HTN (hypertension)  - labetaloL (TRANDATE; NORMODYNE) 100 MG tablet; Take 1 tablet (100 mg total) by mouth 2 (two) times a day.  Dispense: 180 tablet; Refill: 3    If protocol passes may refill for 12 months if within 3 months of last provider visit (or a total of 15 months).             Passed - Blood pressure filed in past 12 months     BP Readings from Last 1 Encounters:   01/27/21 118/88

## 2021-06-16 PROBLEM — E66.01 MORBID OBESITY (H): Status: ACTIVE | Noted: 2019-11-14

## 2021-06-16 PROBLEM — F41.9 ANXIETY: Status: ACTIVE | Noted: 2017-06-02

## 2021-06-16 PROBLEM — D50.9 IRON DEFICIENCY ANEMIA: Status: ACTIVE | Noted: 2017-06-19

## 2021-06-17 NOTE — PROGRESS NOTES
ASSESSMENT/PLAN:       1. Vaginal discharge  -Patient symptoms are consistent with the yeast infection and she has had recurrent yeast infections in the past.  Given her current exam I am going to have her try treating for a longer period of time to see if this helps.  If symptoms are not improving over the next week or 2 I would consider trialing a different topical such as Chlortrimazole.  She will let me know how she is doing in the next week.  - Wet Prep, Vaginal  - fluconazole (DIFLUCAN) 150 MG tablet; Take 1 tablet (150 mg total) by mouth every third day. For one week, then weekly for 3 months  Dispense: 14 tablet; Refill: 0    2. Hypertension  -Patient's blood pressure is under good control at this time and she is tolerating her medications, updating lab work and follow-up in 6 months or sooner as necessary.  - Comprehensive Metabolic Panel    3. Iron deficiency anemia  -She continues to be quite anemic, I will contact her to discuss iron supplementation and possibly menstrual period control.   - HM2(CBC w/o Differential)    4. PCOS (polycystic ovarian syndrome)  -Updating labs today, continue on metformin and follow-up in 6 months.  - Glycosylated Hemoglobin A1c      Rossy Ridley MD      PROGRESS NOTE   4/18/2018    SUBJECTIVE:  Karyna Hammer is a 37 y.o. female  who presents for possible yeast infection.     She has done 3 doses of diflucan and Monistat Shedoes get them a lot but this is the first time it hasn't gone away. It has been since 3/17. She has had some chunky white discharge and it is always after sex. She has had no odor. She is quite itchy and does have a dry feeling as well.     She is otherwise doing wel. Exercising as able. Watching salt. Metformin is causing more stomach upset. She does have regular periods though.  She is otherwise tolerating the medication without difficulty.  She has no other questions or concerns today regarding her chronic medications.  Chief Complaint    Patient presents with     Vaginitis     Patient believes that she has had a yeast infection for a month. She is having vaginal itching and discomfort. No known fevers. No pain with urination.          Patient Active Problem List   Diagnosis     Hypertension     Hiatal Hernia     Shortness Of Breath     PCOS (polycystic ovarian syndrome)     Anxiety     Iron deficiency anemia       Current Outpatient Prescriptions   Medication Sig Dispense Refill     aspirin-acetaminophen-caffeine (EXCEDRIN MIGRAINE) 250-250-65 mg per tablet Take 1 tablet by mouth as needed for pain.        hydroCHLOROthiazide (HYDRODIURIL) 25 MG tablet Take 1 tablet (25 mg total) by mouth daily. 90 tablet 3     ibuprofen (ADVIL,MOTRIN) 200 MG tablet Take 400 mg by mouth as needed for pain.        labetalol (TRANDATE; NORMODYNE) 100 MG tablet TAKE 1 TABLET(100 MG) BY MOUTH TWICE DAILY 60 tablet 2     metFORMIN (GLUCOPHAGE-XR) 500 MG 24 hr tablet TAKE 4 TABLETS BY MOUTH EVERY MORNING WITH BREAKFAST 360 tablet 0     omeprazole (PRILOSEC) 20 MG capsule Take 20 mg by mouth daily as needed.       fluconazole (DIFLUCAN) 150 MG tablet Take 1 tablet (150 mg total) by mouth every third day. For one week, then weekly for 3 months 14 tablet 0     metFORMIN (GLUCOPHAGE-XR) 500 MG 24 hr tablet Take 4 tablets (2,000 mg total) by mouth daily with breakfast. 360 tablet 3     No current facility-administered medications for this visit.        History   Smoking Status     Never Smoker   Smokeless Tobacco     Never Used           OBJECTIVE:        Recent Results (from the past 240 hour(s))   HM2(CBC w/o Differential)   Result Value Ref Range    WBC 4.4 4.0 - 11.0 thou/uL    RBC 4.19 3.80 - 5.40 mill/uL    Hemoglobin 9.5 (L) 12.0 - 16.0 g/dL    Hematocrit 29.5 (L) 35.0 - 47.0 %    MCV 70 (L) 80 - 100 fL    MCH 22.7 (L) 27.0 - 34.0 pg    MCHC 32.3 32.0 - 36.0 g/dL    RDW 17.3 (H) 11.0 - 14.5 %    Platelets 265 140 - 440 thou/uL    MPV 9.3 7.0 - 10.0 fL   Glycosylated  Hemoglobin A1c   Result Value Ref Range    Hemoglobin A1c 5.0 3.5 - 6.0 %   Wet Prep, Vaginal   Result Value Ref Range    Yeast Result No yeast seen No yeast seen    Trichomonas No Trichomonas seen No Trichomonas seen    Clue Cells, Wet Prep No Clue cells seen No Clue cells seen       Vitals:    04/18/18 1407   BP: 128/88   Patient Site: Right Arm   Patient Position: Sitting   Cuff Size: Adult Large   Pulse: 78   SpO2: 98%   Weight: (!) 275 lb 3.2 oz (124.8 kg)     Weight: 275 lb 3.2 oz (124.8 kg)        Physical Exam:  GENERAL APPEARANCE: A&A, NAD, well hydrated, well nourished  SKIN:  Normal skin turgor, no lesions/rashes   HEENT: moist mucous membranes, no rhinorrhea  NECK: Normal  CV: RRR, no M/G/R   LUNGS: CTAB  Vaginal exam reveals normal external genitalia, she does have some white plaque present on the vaginal mucosa and some whitish discharge present but cream is also evident  EXTREMITY: no edema   NEURO: no gross deficits

## 2021-06-20 NOTE — LETTER
Letter by Izabela Asif RN at      Author: Izabela Asif RN Service: -- Author Type: --    Filed:  Encounter Date: 6/20/2020 Status: (Other)       6/20/2020        Karyna Hammer  [address] [place] [address]    [facility]  6/17/20 1136   COVID-19 Antibody Screen  Negative     Comment: No COVID-19 antibodies detected.  Patients within 10 days of symptom onset for   COVID-19 may not produce sufficient levels of detectable antibodies.   Immunocompromised COVID-19 patients may take longer to develop antibodies.      You have tested NEGATIVE for COVID-19 antibodies. This suggests you have not had or been exposed to COVID-19. But it does not mean that for sure.    The test finds antibodies in most people 10 days after they get sick. For some people, it takes longer than 10 days for antibodies to show up. Others may never show antibodies against COVID-19, especially if they have weak immune systems.    If you have COVID-19 symptoms now, please stay home and away from others.     Your current symptoms may or may not be COVID-19.     What is antibody testing?  This is a kind of blood test. We take a small sample of your blood, and then test it for something called antibodies.   Your body makes antibodies to fight infection. If your blood has antibodies for a certain germ, it means youve been infected with that germ in the past.   Sometimes, antibodies stay in your body for years after youve had the infection. They can be there even if the germ didnt make you sick. They are a sign that your body fought off the infection.  Will this test find antibodies in everyone whos had COVID-19?  No. The test finds antibodies in most people 10 days after they get sick. For some people, it takes longer than 10 days for antibodies to show up. Others may never show antibodies against COVID-19, especially if they have weak immune systems.  What are the signs of COVID-19?  Signs of COVID-19 can appear from 2 to 14 days  (up to 2 weeks) after youre infected. Some people have no symptoms or only mild symptoms. Others get very sick. The most common symptoms are:      Cough    Shortness of breath or trouble breathing    Or at least 2 of these symptoms:      Fever    Chills    Repeated shaking with chills    Muscle pain    Headache    Sore throat    Losing your sense of taste or smell    You may have other symptoms. Please contact your doctor or clinic for any symptoms that worry you.    Where can I get more information?     To learn the [facility] guidelines for staying home, please visit the [place] Department of Health website at [email or web address]    To learn more about COVID-19 and how to care for yourself at home, please visit the CDC website at https://www.cdc.gov/coronavirus/2019-ncov/about/steps-when-sick.html    For more options for care at [facility], please visit our website at [email or web address]    [facility] ([facility]) COVID-19 Hotline:  [phone]

## 2021-06-21 NOTE — PROGRESS NOTES
Assessment:     1. Routine general medical examination at a health care facility    2. Heart palpitations    3. Hypertension    4. PCOS (polycystic ovarian syndrome)    5. Iron deficiency anemia    6. Lipid screening    7. Immunization due        Plan:        1. Routine general medical examination at a health care facility  -Routine health maintenance discussion:  No smoking, limited alcohol (7 or less servings per week), 5 fruits/veg servings per day, 200 minutes of exercise per week.  Daily calcium/vitamin D guidelines, bone health, colon cancer screening beginning at age 50.  Accident avoidance, sun screen.     2. Heart palpitations  -Likely due to some PVCs or PACs as she has had a workup in the past including echocardiogram, back in 2010.  As it is more frequent again now than it has been historically for some time I did order an event monitor for her to wear for the next week to see if we can capture the symptoms.  She is comfortable with this.  Updating thyroid cascade as well.  Certainly consider possible etiology of anxiety as well.  - Electrocardiogram Perform and Read  - Thyroid Cascade  - MICHAEL Hook-Up; Future    3. Hypertension  -Blood pressure is not under good control.  She is not taking her medication twice daily as prescribed, she would like to start with that first and then she will follow-up here for repeat blood pressure check and if still not improving consider changing medication.    4. PCOS (polycystic ovarian syndrome)  -Updating A1c today.  Continue on metformin for now, and plan on following up here in 1 year.  - Glycosylated Hemoglobin A1c    5. Iron deficiency anemia  -Was found to be fairly iron deficient on her last blood draw, updating labs today and treat accordingly  - HM2(CBC w/o Differential)  - Ferritin  - Iron and Transferrin Iron Binding Capacity    6. Lipid screening  - Comprehensive Metabolic Panel  - Lipid Cascade    7. Immunization due  - Tdap vaccine,  8yo or older,  IM  -  Influenza, Seasonal Quad, Preservative Free 36+ Months       Subjective:      Karyna Hammer is a 38 y.o. female who presents for an annual exam. The patient is sexually active. The patient participates in regular exercise: no. The patient reports that there is not domestic violence in her life.     Increased stress at home and at work. This time when she started feeling them, she took out a stethoscope, she hears the missed beat and the beat that comes back in is what she feels. It will happen occasionally. Recently it has been daily, she doesn't note it when she is busy, she notes it when she is sitting on the couch or not doing something.     She does have a family history of recent cardiac arrest and her brother-in-law which obviously makes her more anxious.    Healthy Habits:   Regular Exercise: No  Sunscreen Use: Yes  Healthy Diet: Yes  Dental Visits Regularly: No  Seat Belt: Yes  Sexually active: Yes  Self Breast Exam Monthly:No  Hemoccults: N/A  Flex Sig: N/A  Colonoscopy: N/A  Lipid Profile: Yes  Glucose Screen: Yes  Prevention of Osteoporosis: No  Last Dexa: N/A  Guns at Home:  No      Immunization History   Administered Date(s) Administered     Influenza, inj, historic,unspecified 11/06/2007     Influenza,live, Nasal Laiv4 01/29/2014     Influenza,seasonal quad, PF, 36+MOS 11/05/2018     Td,adult,historic,unspecified 1978     Tdap 11/05/2018     Immunization status: up to date and documented, missing doses of Tdap and flu.    No exam data present    Gynecologic History  Patient's last menstrual period was 10/20/2018.  Contraception: essure inplants  Last Pap: 7/14/15. Results were: normal  Last mammogram: N/A. Results were: N/A      OB History   No data available       Current Outpatient Prescriptions   Medication Sig Dispense Refill     cetirizine (ZYRTEC) 10 MG tablet Take 10 mg by mouth as needed for allergies.       ferrous sulfate 325 (65 FE) MG tablet Take 1 tablet (325 mg total) by mouth 2  (two) times a day with meals. 60 tablet 3     hydroCHLOROthiazide (HYDRODIURIL) 25 MG tablet TAKE 1 TABLET(25 MG) BY MOUTH DAILY 90 tablet 0     ibuprofen (ADVIL,MOTRIN) 200 MG tablet Take 400 mg by mouth as needed for pain.        labetalol (TRANDATE; NORMODYNE) 100 MG tablet Take 1 tablet (100 mg total) by mouth 2 (two) times a day. 60 tablet 11     metFORMIN (GLUCOPHAGE-XR) 500 MG 24 hr tablet TAKE 4 TABLETS BY MOUTH EVERY MORNING WITH BREAKFAST 360 tablet 0     omeprazole (PRILOSEC) 20 MG capsule Take 20 mg by mouth daily as needed.       aspirin-acetaminophen-caffeine (EXCEDRIN MIGRAINE) 250-250-65 mg per tablet Take 1 tablet by mouth as needed for pain.        No current facility-administered medications for this visit.      History reviewed. No pertinent past medical history.  Past Surgical History:   Procedure Laterality Date     ESSURE TUBAL LIGATION       Review of patient's allergies indicates no known allergies.  Family History   Problem Relation Age of Onset     Prostate cancer Father      Dementia Maternal Grandmother      Heart disease Maternal Grandfather      Ovarian cancer Paternal Grandmother      Dementia Paternal Grandmother      Lung cancer Paternal Grandfather      Social History     Social History     Marital status:      Spouse name: N/A     Number of children: N/A     Years of education: N/A     Occupational History     Not on file.     Social History Main Topics     Smoking status: Never Smoker     Smokeless tobacco: Never Used     Alcohol use Yes     Drug use: No     Sexual activity: Yes     Partners: Male     Birth control/ protection: Surgical      Comment: Essure      Other Topics Concern     Not on file     Social History Narrative       Review of Systems  Review of Systems     With the exception of the aforementioned issues, 12 point, comprehensive ROS was done and was negative.       Objective:         Vitals:    11/05/18 1049 11/05/18 1149   BP: (!) 140/96 (!) 138/100  "  Pulse: 72    Temp: 98  F (36.7  C)    TempSrc: Oral    SpO2: 98%    Weight: (!) 269 lb (122 kg)    Height: 5' 8\" (1.727 m)      Body mass index is 40.9 kg/(m^2).    Physical  Physical Exam    Gen: Well developed, well nourished, no acute distress.  HEENT: normocephalic/atraumatic, PERRLA/EOMI, TMs: Gray, normal light reflex, no nasal discharge.  Oral mucosa: no erythema/exudate  Neck: No LAD/masses/thyromegaly  Lungs: clear bilaterally  Heart: regular rate and rhythm, no murmurs/gallops/rubs  Breasts: symmetric, no masses/skin changes, nipple discharge, or axillary LAD.  BSE reviewed.  Abdomen: Normal bowel sounds, soft, non-tender, non-distended, no masses, neg Bella's/McBurney's, no rebound/guarding  Genital:No complaints, pap up to date  Lymphatics: no supraclavicular/axillary/epitrochlear/inguinal LAD. No edema.  Neuro: A&O x 3, CN II-XII intact, strength 5/5, reflexes symmetric, sensory intact to light touch.  Psych: Behavior appropriate, engaging.  Thought processes congruent, non-tangential.  Musculoskeletal: no gross deformities.  Skin: no rashes or lesions.     Results for orders placed or performed in visit on 11/05/18   Comprehensive Metabolic Panel   Result Value Ref Range    Sodium 140 136 - 145 mmol/L    Potassium 3.8 3.5 - 5.0 mmol/L    Chloride 106 98 - 107 mmol/L    CO2 23 22 - 31 mmol/L    Anion Gap, Calculation 11 5 - 18 mmol/L    Glucose 89 70 - 125 mg/dL    BUN 10 8 - 22 mg/dL    Creatinine 0.79 0.60 - 1.10 mg/dL    GFR MDRD Af Amer >60 >60 mL/min/1.73m2    GFR MDRD Non Af Amer >60 >60 mL/min/1.73m2    Bilirubin, Total 0.7 0.0 - 1.0 mg/dL    Calcium 9.8 8.5 - 10.5 mg/dL    Protein, Total 7.0 6.0 - 8.0 g/dL    Albumin 4.2 3.5 - 5.0 g/dL    Alkaline Phosphatase 57 45 - 120 U/L    AST 15 0 - 40 U/L    ALT 14 0 - 45 U/L   HM2(CBC w/o Differential)   Result Value Ref Range    WBC 4.8 4.0 - 11.0 thou/uL    RBC 4.56 3.80 - 5.40 mill/uL    Hemoglobin 13.0 12.0 - 16.0 g/dL    Hematocrit 38.5 35.0 - " 47.0 %    MCV 84 80 - 100 fL    MCH 28.5 27.0 - 34.0 pg    MCHC 33.7 32.0 - 36.0 g/dL    RDW 14.6 (H) 11.0 - 14.5 %    Platelets 252 140 - 440 thou/uL    MPV 8.2 7.0 - 10.0 fL   Lipid Cascade   Result Value Ref Range    Cholesterol 208 (H) <=199 mg/dL    Triglycerides 123 <=149 mg/dL    HDL Cholesterol 53 >=50 mg/dL    LDL Calculated 130 (H) <=129 mg/dL    Patient Fasting > 8hrs? Yes    Thyroid Cascade   Result Value Ref Range    TSH 3.14 0.30 - 5.00 uIU/mL   Ferritin   Result Value Ref Range    Ferritin 9 (L) 10 - 130 ng/mL   Iron and Transferrin Iron Binding Capacity   Result Value Ref Range    Iron 314 (H) 42 - 175 ug/dL    Transferrin 325 212 - 360 mg/dL    Transferrin Saturation, Calculated 77 (H) 20 - 50 %    Transferrin IBC, Calculated 406 313 - 563 ug/dL   Glycosylated Hemoglobin A1c   Result Value Ref Range    Hemoglobin A1c 5.1 3.5 - 6.0 %   Electrocardiogram Perform and Read   Result Value Ref Range    SYSTOLIC BLOOD PRESSURE  mmHg    DIASTOLIC BLOOD PRESSURE  mmHg    VENTRICULAR RATE 68 BPM    ATRIAL RATE 68 BPM    P-R INTERVAL 178 ms    QRS DURATION 92 ms    Q-T INTERVAL 410 ms    QTC CALCULATION (BEZET) 435 ms    P Axis -5 degrees    R AXIS -23 degrees    T AXIS 1 degrees    MUSE DIAGNOSIS       Sinus rhythm  Normal ECG  When compared with ECG of 08-JUN-2017 11:53,  No significant change was found  Confirmed by HERNANDEZ FIGUEROA MD LOC:[facility] ([id number]) on 11/5/2018 11:46:30 AM

## 2021-06-23 NOTE — TELEPHONE ENCOUNTER
Refill Approved    Rx renewed per Medication Renewal Policy. Medication was last renewed on 10/1/18.    Glory Morejon, [facility] Triage/Med Refill 1/10/2019     Requested Prescriptions   Pending Prescriptions Disp Refills     hydroCHLOROthiazide (HYDRODIURIL) 25 MG tablet [Pharmacy Med Name: HYDROCHLOROTHIAZIDE 25MG TABLETS] 90 tablet 0     Sig: TAKE 1 TABLET(25 MG) BY MOUTH DAILY    Diuretics/Combination Diuretics Refill Protocol  Passed - 1/9/2019  2:00 PM       Passed - Visit with PCP or prescribing provider visit in past 12 months    Last office visit with prescriber/PCP: 4/18/2018 Rossy Ridley MD OR same dept: 4/18/2018 Rossy Ridley MD OR same specialty: 4/18/2018 Rossy Ridley MD  Last physical: 11/5/2018 Last MTM visit: Visit date not found   Next visit within 3 mo: Visit date not found  Next physical within 3 mo: Visit date not found  Prescriber OR PCP: Rossy Ridley MD  Last diagnosis associated with med order: 1. Essential hypertension with goal blood pressure less than 130/80  - hydroCHLOROthiazide (HYDRODIURIL) 25 MG tablet [Pharmacy Med Name: HYDROCHLOROTHIAZIDE 25MG TABLETS]; TAKE 1 TABLET(25 MG) BY MOUTH DAILY  Dispense: 90 tablet; Refill: 0    If protocol passes may refill for 12 months if within 3 months of last provider visit (or a total of 15 months).            Passed - Serum Potassium in past 12 months     Lab Results   Component Value Date    Potassium 3.8 11/05/2018            Passed - Serum Sodium in past 12 months     Lab Results   Component Value Date    Sodium 140 11/05/2018            Passed - Blood pressure on file in past 12 months    BP Readings from Last 1 Encounters:   11/05/18 (!) 138/100            Passed - Serum Creatinine in past 12 months     Creatinine   Date Value Ref Range Status   11/05/2018 0.79 0.60 - 1.10 mg/dL Final

## 2021-09-05 ENCOUNTER — HEALTH MAINTENANCE LETTER (OUTPATIENT)
Age: 41
End: 2021-09-05

## 2021-11-11 DIAGNOSIS — I10 ESSENTIAL HYPERTENSION WITH GOAL BLOOD PRESSURE LESS THAN 130/80: ICD-10-CM

## 2021-11-11 RX ORDER — HYDROCHLOROTHIAZIDE 25 MG/1
TABLET ORAL
Qty: 90 TABLET | Refills: 2 | Status: SHIPPED | OUTPATIENT
Start: 2021-11-11 | End: 2022-08-08

## 2021-12-06 ENCOUNTER — MYC MEDICAL ADVICE (OUTPATIENT)
Dept: FAMILY MEDICINE | Facility: CLINIC | Age: 41
End: 2021-12-06
Payer: COMMERCIAL

## 2021-12-06 DIAGNOSIS — B37.31 YEAST VAGINITIS: Primary | ICD-10-CM

## 2021-12-06 RX ORDER — FLUCONAZOLE 150 MG/1
150 TABLET ORAL ONCE
Qty: 1 TABLET | Refills: 1 | Status: SHIPPED | OUTPATIENT
Start: 2021-12-06 | End: 2021-12-06

## 2022-02-10 DIAGNOSIS — I10 HTN (HYPERTENSION): ICD-10-CM

## 2022-02-14 RX ORDER — LABETALOL 100 MG/1
TABLET, FILM COATED ORAL
Qty: 180 TABLET | Refills: 0 | Status: SHIPPED | OUTPATIENT
Start: 2022-02-14 | End: 2022-07-18

## 2022-02-14 NOTE — TELEPHONE ENCOUNTER
"Routing refill request to provider for review/approval because:  Patient needs to be seen because it has been more than 1 year since last office visit.  BP not current  Early refill requested.    Last Written Prescription Date:  3/4/21  Last Fill Quantity: 180,  # refills: 3   Last office visit provider:  1/27/21     Requested Prescriptions   Pending Prescriptions Disp Refills     labetalol (NORMODYNE) 100 MG tablet [Pharmacy Med Name: LABETALOL  MG TABLET] 180 tablet 3     Sig: TAKE 1 TABLET BY MOUTH TWICE A DAY       Beta-Blockers Protocol Failed - 2/10/2022  1:29 AM        Failed - Blood pressure under 140/90 in past 12 months     BP Readings from Last 3 Encounters:   01/27/21 118/88   11/13/19 114/74                 Failed - Recent (12 mo) or future (30 days) visit within the authorizing provider's specialty     Patient has had an office visit with the authorizing provider or a provider within the authorizing providers department within the previous 12 mos or has a future within next 30 days. See \"Patient Info\" tab in inbasket, or \"Choose Columns\" in Meds & Orders section of the refill encounter.              Passed - Patient is age 6 or older        Passed - Medication is active on med list             Hilton Beebe RN 02/14/22 7:54 AM  "

## 2022-03-03 NOTE — TELEPHONE ENCOUNTER
Left message to call back for: pt  Information to relay to patient:  See below   Take the following medications the morning of surgery with a small sip of water:    Gabapentin   Amlodipine  trelegy inhaler     If you are on prescription narcotic pain medication to control your pain you may also take that medication the morning of surgery.    General Instructions:  • Do not eat or drink anything after midnight the night before surgery.  • Infants may have breast milk up to four hours before surgery.  • Infants drinking formula may drink formula up to six hours before surgery.   • Patients who avoid smoking, chewing tobacco and alcohol for 4 weeks prior to surgery have a reduced risk of post-operative complications.  Quit smoking as many days before surgery as you can.  • Do not smoke, use chewing tobacco or drink alcohol the day of surgery.   • If applicable bring your C-PAP/ BI-PAP machine.  • Bring any papers given to you in the doctor’s office.  • Wear clean comfortable clothes.  • Do not wear contact lenses, false eyelashes or make-up.  Bring a case for your glasses.   • Bring crutches or walker if applicable.  • Remove all piercings.  Leave jewelry and any other valuables at home.  • Hair extensions with metal clips must be removed prior to surgery.  • The Pre-Admission Testing nurse will instruct you to bring medications if unable to obtain an accurate list in Pre-Admission Testing.        If you were given a blood bank ID arm band remember to bring it with you the day of surgery.    Preventing a Surgical Site Infection:  • For 2 to 3 days before surgery, avoid shaving with a razor because the razor can irritate skin and make it easier to develop an infection.    • Any areas of open skin can increase the risk of a post-operative wound infection by allowing bacteria to enter and travel throughout the body.  Notify your surgeon if you have any skin wounds / rashes even if it is not near the expected surgical site.  The area will need assessed to determine if surgery should be delayed until it  is healed.  • The night prior to surgery shower using a fresh bar of anti-bacterial soap (such as Dial) and clean washcloth.  Sleep in a clean bed with clean clothing.  Do not allow pets to sleep with you.  • Shower on the morning of surgery using a fresh bar of anti-bacterial soap (such as Dial) and clean washcloth.  Dry with a clean towel and dress in clean clothing.  • Ask your surgeon if you will be receiving antibiotics prior to surgery.  • Make sure you, your family, and all healthcare providers clean their hands with soap and water or an alcohol based hand  before caring for you or your wound.    Day of surgery:3/4/2022   0830  Your arrival time is approximately two hours before your scheduled surgery time.  Upon arrival, a Pre-op nurse and Anesthesiologist will review your health history, obtain vital signs, and answer questions you may have.  The only belongings needed at this time will be your home medications and if applicable your C-PAP/BI-PAP machine.  A Pre-op nurse will start an IV and you may receive medication in preparation for surgery, including something to help you relax.      Please be aware that surgery does come with discomfort.  We want to make every effort to control your discomfort so please discuss any uncontrolled symptoms with your nurse.   Your doctor will most likely have prescribed pain medications.      If you are going home after surgery you will receive individualized written care instructions before being discharged.  A responsible adult must drive you to and from the hospital on the day of your surgery and stay with you for 24 hours.  Discharge prescriptions can be filled by the hospital pharmacy during regular pharmacy hours.  If you are having surgery late in the day/evening your prescription may be e-prescribed to your pharmacy.  Please verify your pharmacy hours or chose a 24 hour pharmacy to avoid not having access to your prescription because your pharmacy has  closed for the day.    If you are staying overnight following surgery, you will be transported to your hospital room following the recovery period.  [facility] has all private rooms.    If you have any questions please call Pre-Admission Testing at [phone].  Deductibles and co-payments are collected on the day of service. Please be prepared to pay the required co-pay, deductible or deposit on the day of service as defined by your plan.    Patient Education for Self-Quarantine Process    • Following your COVID testing, we strongly recommend that you wear a mask when you are with other people and practice social distancing.   • Limit your activities to only required outings.  • Wash your hands with soap and water frequently for at least 20 seconds.   • Avoid touching your eyes, nose and mouth with unwashed hands.  • Do not share anything - utensils, drinking glasses, food from the same bowl.   • Sanitize household surfaces daily. Include all high touch areas (door handles, light switches, phones, countertops, etc.)    Call your surgeon immediately if you experience any of the following symptoms:  • Sore Throat  • Shortness of Breath or difficulty breathing  • Cough  • Chills  • Body soreness or muscle pain  • Headache  • Fever  • New loss of taste or smell  • Do not arrive for your surgery ill.  Your procedure will need to be rescheduled to another time.  You will need to call your physician before the day of surgery to avoid any unnecessary exposure to hospital staff as well as other patients.

## 2022-05-10 DIAGNOSIS — E28.2 PCOS (POLYCYSTIC OVARIAN SYNDROME): ICD-10-CM

## 2022-05-12 NOTE — TELEPHONE ENCOUNTER
"Routing refill request to provider for review/approval because:  Patient needs to be seen because it has been more than 1 year since last office visit.    Last Written Prescription Date:  2/8/22  Last Fill Quantity: 360,  # refills: 0   Last office visit provider:  1/27/21     Requested Prescriptions   Pending Prescriptions Disp Refills     metFORMIN (GLUCOPHAGE XR) 500 MG 24 hr tablet [Pharmacy Med Name: METFORMIN HCL  MG TABLET] 360 tablet 0     Sig: TAKE 2 TABLETS BY MOUTH TWICE A DAY       Biguanide Agents Failed - 5/12/2022  7:39 AM        Failed - Recent (12 mo) or future (30 days) visit within the authorizing provider's specialty      Patient has had an office visit with the authorizing provider or a provider within the authorizing providers department within the previous 12 mos or has a future within next 30 days. See \"Patient Info\" tab in inbasket, or \"Choose Columns\" in Meds & Orders section of the refill encounter.              Passed - Patient is age 10 or older        Passed - Patient does NOT have a diagnosis of CHF.        Passed - Medication is active on med list        Passed - Patient is not pregnant        Passed - Patient has not had a positive pregnancy test within the past 12 mos.              Hilton Beebe RN 05/12/22 7:39 AM    "

## 2022-05-13 RX ORDER — METFORMIN HCL 500 MG
TABLET, EXTENDED RELEASE 24 HR ORAL
Qty: 360 TABLET | Refills: 0 | Status: SHIPPED | OUTPATIENT
Start: 2022-05-13 | End: 2022-08-08

## 2022-05-13 NOTE — TELEPHONE ENCOUNTER
Please call patient and help her set up an appointment to be seen, physical or medication check is fine.

## 2022-07-17 DIAGNOSIS — I10 HTN (HYPERTENSION): ICD-10-CM

## 2022-07-18 ENCOUNTER — TELEPHONE (OUTPATIENT)
Dept: FAMILY MEDICINE | Facility: CLINIC | Age: 42
End: 2022-07-18

## 2022-07-18 DIAGNOSIS — I10 HTN (HYPERTENSION): ICD-10-CM

## 2022-07-18 DIAGNOSIS — I10 ESSENTIAL HYPERTENSION WITH GOAL BLOOD PRESSURE LESS THAN 130/80: ICD-10-CM

## 2022-07-18 RX ORDER — LABETALOL 100 MG/1
TABLET, FILM COATED ORAL
Qty: 180 TABLET | Refills: 0 | Status: SHIPPED | OUTPATIENT
Start: 2022-07-18 | End: 2022-10-18

## 2022-07-18 NOTE — TELEPHONE ENCOUNTER
Labetaolol medication approved today for 3 months.   Hydrochlorothiazide- should have enough to last until appt.

## 2022-07-18 NOTE — TELEPHONE ENCOUNTER
Reason for Call:  Other prescription    Detailed comments: patient called and scheduled an appointment with Khai Grey at [facility] in October.  Would like medications in 3 days or proior to this appointment.  Patient will be out of Hydroclorothyizide and Labetalol.  Would like to pickup at [facility].  Please contact patient.  Thank you.    Phone Number Patient can be reached at: Cell number on file:    Telephone Information:   Mobile [phone]   Mobile [phone]       Best Time: any    Can we leave a detailed message on this number? YES    Call taken on 7/18/2022 at 10:12 AM by Yeni Jones

## 2022-07-18 NOTE — TELEPHONE ENCOUNTER
"Routing refill request to provider for review/approval because:  Labs not current:  BP  Patient needs to be seen because it has been more than 1 year since last office visit.    Last Written Prescription Date:  2/14/22  Last Fill Quantity: 180,  # refills: 0   Last office visit provider:  1/27/21     Requested Prescriptions   Pending Prescriptions Disp Refills     labetalol (NORMODYNE) 100 MG tablet [Pharmacy Med Name: LABETALOL  MG TABLET] 180 tablet 0     Sig: TAKE 1 TABLET BY MOUTH TWICE A DAY       Beta-Blockers Protocol Failed - 7/17/2022  7:46 AM        Failed - Blood pressure under 140/90 in past 12 months     BP Readings from Last 3 Encounters:   01/27/21 118/88   11/13/19 114/74                 Failed - Recent (12 mo) or future (30 days) visit within the authorizing provider's specialty     Patient has had an office visit with the authorizing provider or a provider within the authorizing providers department within the previous 12 mos or has a future within next 30 days. See \"Patient Info\" tab in inbasket, or \"Choose Columns\" in Meds & Orders section of the refill encounter.              Passed - Patient is age 6 or older        Passed - Medication is active on med list             Cecelia Nuñez 07/17/22 7:47 PM  "

## 2022-10-05 ENCOUNTER — OFFICE VISIT (OUTPATIENT)
Dept: FAMILY MEDICINE | Facility: CLINIC | Age: 42
End: 2022-10-05
Payer: COMMERCIAL

## 2022-10-05 VITALS
BODY MASS INDEX: 41.52 KG/M2 | DIASTOLIC BLOOD PRESSURE: 80 MMHG | SYSTOLIC BLOOD PRESSURE: 120 MMHG | OXYGEN SATURATION: 100 % | TEMPERATURE: 98.2 F | HEART RATE: 68 BPM | RESPIRATION RATE: 16 BRPM | HEIGHT: 68 IN | WEIGHT: 274 LBS

## 2022-10-05 DIAGNOSIS — N92.4 EXCESSIVE BLEEDING IN PREMENOPAUSAL PERIOD: ICD-10-CM

## 2022-10-05 DIAGNOSIS — Z13.220 LIPID SCREENING: ICD-10-CM

## 2022-10-05 DIAGNOSIS — D50.9 IRON DEFICIENCY ANEMIA, UNSPECIFIED IRON DEFICIENCY ANEMIA TYPE: ICD-10-CM

## 2022-10-05 DIAGNOSIS — I10 ESSENTIAL HYPERTENSION WITH GOAL BLOOD PRESSURE LESS THAN 130/80: ICD-10-CM

## 2022-10-05 DIAGNOSIS — Z11.59 ENCOUNTER FOR HEPATITIS C SCREENING TEST FOR LOW RISK PATIENT: ICD-10-CM

## 2022-10-05 DIAGNOSIS — Z12.4 CERVICAL CANCER SCREENING: ICD-10-CM

## 2022-10-05 DIAGNOSIS — E66.01 MORBID OBESITY (H): ICD-10-CM

## 2022-10-05 DIAGNOSIS — E28.2 PCOS (POLYCYSTIC OVARIAN SYNDROME): ICD-10-CM

## 2022-10-05 DIAGNOSIS — Z00.00 ENCOUNTER FOR ROUTINE HISTORY AND PHYSICAL EXAMINATION OF ADULT: Primary | ICD-10-CM

## 2022-10-05 LAB
ERYTHROCYTE [DISTWIDTH] IN BLOOD BY AUTOMATED COUNT: 16.2 % (ref 10–15)
HBA1C MFR BLD: 5 % (ref 0–5.6)
HCT VFR BLD AUTO: 26.8 % (ref 35–47)
HGB BLD-MCNC: 7.8 G/DL (ref 11.7–15.7)
MCH RBC QN AUTO: 20.3 PG (ref 26.5–33)
MCHC RBC AUTO-ENTMCNC: 29.1 G/DL (ref 31.5–36.5)
MCV RBC AUTO: 70 FL (ref 78–100)
PLATELET # BLD AUTO: 312 10E3/UL (ref 150–450)
RBC # BLD AUTO: 3.84 10E6/UL (ref 3.8–5.2)
WBC # BLD AUTO: 3.8 10E3/UL (ref 4–11)

## 2022-10-05 PROCEDURE — 83550 IRON BINDING TEST: CPT | Performed by: FAMILY MEDICINE

## 2022-10-05 PROCEDURE — 87624 HPV HI-RISK TYP POOLED RSLT: CPT | Performed by: FAMILY MEDICINE

## 2022-10-05 PROCEDURE — 83036 HEMOGLOBIN GLYCOSYLATED A1C: CPT | Performed by: FAMILY MEDICINE

## 2022-10-05 PROCEDURE — 80053 COMPREHEN METABOLIC PANEL: CPT | Performed by: FAMILY MEDICINE

## 2022-10-05 PROCEDURE — 86803 HEPATITIS C AB TEST: CPT | Performed by: FAMILY MEDICINE

## 2022-10-05 PROCEDURE — 85027 COMPLETE CBC AUTOMATED: CPT | Performed by: FAMILY MEDICINE

## 2022-10-05 PROCEDURE — 36415 COLL VENOUS BLD VENIPUNCTURE: CPT | Performed by: FAMILY MEDICINE

## 2022-10-05 PROCEDURE — 99396 PREV VISIT EST AGE 40-64: CPT | Performed by: FAMILY MEDICINE

## 2022-10-05 PROCEDURE — 82607 VITAMIN B-12: CPT | Performed by: FAMILY MEDICINE

## 2022-10-05 PROCEDURE — 83540 ASSAY OF IRON: CPT | Performed by: FAMILY MEDICINE

## 2022-10-05 PROCEDURE — G0145 SCR C/V CYTO,THINLAYER,RESCR: HCPCS | Performed by: FAMILY MEDICINE

## 2022-10-05 PROCEDURE — 80061 LIPID PANEL: CPT | Performed by: FAMILY MEDICINE

## 2022-10-05 ASSESSMENT — ENCOUNTER SYMPTOMS
DIZZINESS: 0
FEVER: 0
SORE THROAT: 0
COUGH: 0
HEMATURIA: 0
PARESTHESIAS: 0
BREAST MASS: 0
ARTHRALGIAS: 0
HEARTBURN: 0
CHILLS: 0
PALPITATIONS: 0
WEAKNESS: 0
NERVOUS/ANXIOUS: 0
JOINT SWELLING: 0
EYE PAIN: 0
HEADACHES: 0
CONSTIPATION: 0
DIARRHEA: 1
NAUSEA: 0
HEMATOCHEZIA: 0
DYSURIA: 0
MYALGIAS: 0
SHORTNESS OF BREATH: 1
FREQUENCY: 0
ABDOMINAL PAIN: 0

## 2022-10-05 NOTE — PROGRESS NOTES
SUBJECTIVE:   CC: Karyna is an 42 year old who presents for preventive health visit.     Healthy Habits:     Getting at least 3 servings of Calcium per day:  NO    Bi-annual eye exam:  Yes    Dental care twice a year:  Yes    Sleep apnea or symptoms of sleep apnea:  None    Diet:  Regular (no restrictions)    Frequency of exercise:  1 day/week    Duration of exercise:  15-30 minutes    Taking medications regularly:  Yes    Medication side effects:  Other    PHQ-2 Total Score: 0    Additional concerns today:  No    Periods are still pretty heavy. It is managable.     She is not taking her iron as much due to the metformin. She is down to 3 metformin, does think that she has gained some weight as well.     Taking omeprazole OTC, working well. If she misses one day she is ok, if she misses two days she does have issues.     Exercise is limited with her job.     Today's PHQ-2 Score:   PHQ-2 ( 1999 Pf[employer]) 10/5/2022   Q1: Little interest or pleasure in doing things 0   Q2: Feeling down, depressed or hopeless 0   PHQ-2 Score 0   Q1: Little interest or pleasure in doing things Not at all   Q2: Feeling down, depressed or hopeless Not at all   PHQ-2 Score 0       Abuse: Current or Past (Physical, Sexual or Emotional) - No  Do you feel safe in your environment? Yes    Have you ever done Advance Care Planning? (For example, a Health Directive, POLST, or a discussion with a medical provider or your loved ones about your wishes): No, advance care planning information given to patient to review.  Patient declined advance care planning discussion at this time.    Social History     Tobacco Use     Smoking status: Never Smoker     Smokeless tobacco: Never Used   Substance Use Topics     Alcohol use: Yes     Alcohol/week: 1.0 - 2.0 standard drink       Alcohol Use 10/5/2022   Prescreen: >3 drinks/day or >7 drinks/week? No     Reviewed orders with patient.  Reviewed health maintenance and updated orders accordingly -  Yes      Breast Cancer Screening:    Breast CA Risk Assessment (FHS-7) 10/5/2022   Do you have a family history of breast, colon, or ovarian cancer? No / Unknown         Mammogram Screening - Offered annual screening and updated Health Maintenance for mutual plan based on risk factor consideration    Pertinent mammograms are reviewed under the imaging tab.    History of abnormal Pap smear: NO - age 30-65 PAP every 5 years with negative HPV co-testing recommended  PAP / HPV 7/14/2015   PAP Negative for squamous intraepithelial lesion or malignancy  Electronically signed by Nya Han CT (ASCP) on 7/21/2015 at  2:02 PM       Reviewed and updated as needed this visit by clinical staff   Tobacco  Allergies  Meds  Problems  Med Hx  Surg Hx  Fam Hx  Soc   Hx          Reviewed and updated as needed this visit by Provider   Tobacco  Allergies  Meds  Problems  Med Hx  Surg Hx  Fam Hx               Review of Systems   Constitutional: Negative for chills and fever.   HENT: Negative for congestion, ear pain, hearing loss and sore throat.    Eyes: Negative for pain and visual disturbance.   Respiratory: Positive for shortness of breath. Negative for cough.    Cardiovascular: Negative for chest pain, palpitations and peripheral edema.   Gastrointestinal: Positive for diarrhea. Negative for abdominal pain, constipation, heartburn, hematochezia and nausea.   Breasts:  Negative for tenderness, breast mass and discharge.   Genitourinary: Positive for pelvic pain and urgency. Negative for dysuria, frequency, genital sores, hematuria, vaginal bleeding and vaginal discharge.   Musculoskeletal: Negative for arthralgias, joint swelling and myalgias.   Skin: Negative for rash.   Neurological: Negative for dizziness, weakness, headaches and paresthesias.   Psychiatric/Behavioral: Negative for mood changes. The patient is not nervous/anxious.         OBJECTIVE:   /80   Pulse 68   Temp 98.2  F (36.8  C)   Resp  "16   Ht 1.727 m (5' 8\")   Wt 124.3 kg (274 lb)   LMP 09/30/2022 (Approximate)   SpO2 100%   BMI 41.66 kg/m    Physical Exam  GENERAL: healthy, alert and no distress  EYES: Eyes grossly normal to inspection, PERRL and conjunctivae and sclerae normal  HENT: ear canals and TM's normal, nose and mouth without ulcers or lesions  NECK: no adenopathy, no asymmetry, masses, or scars and thyroid normal to palpation  RESP: lungs clear to auscultation - no rales, rhonchi or wheezes  BREAST: normal without masses, tenderness or nipple discharge and no palpable axillary masses or adenopathy  CV: regular rate and rhythm, normal S1 S2, no S3 or S4, no murmur, click or rub, no peripheral edema and peripheral pulses strong  ABDOMEN: soft, nontender, no hepatosplenomegaly, no masses and bowel sounds normal   (female): normal female external genitalia, normal urethral meatus, vaginal mucosa pink, moist, well rugated, and normal cervix/adnexa/uterus without masses or discharge  MS: no gross musculoskeletal defects noted, no edema  SKIN: no suspicious lesions or rashes  NEURO: Normal strength and tone, mentation intact and speech normal  PSYCH: mentation appears normal, affect normal/bright        ASSESSMENT/PLAN:   Karyna was seen today for physical.    Diagnoses and all orders for this visit:    Encounter for routine history and physical examination of adult   Routine health maintenance discussion:  No smoking, limited alcohol (7 or less servings per week), 5 fruits/veg servings per day, 200 minutes of exercise per week.  Daily calcium/vitamin D guidelines, bone health, colon cancer screening beginning at age 50.  Accident avoidance, sun screen.    Essential hypertension with goal blood pressure less than 130/80  -     Comprehensive metabolic panel; Future  -     Comprehensive metabolic panel  -     hydrochlorothiazide (HYDRODIURIL) 25 MG tablet; Take 1 tablet (25 mg) by mouth daily  - Blood pressure under good control at this " time.  She will continue on current medications, updating lab work and she will follow-up here in 1 year if doing well.  Has not been tolerating the iron supplementation and periods continue to be quite heavy.    Excessive bleeding in premenopausal period   -We discussed options for her including medications versus referral to OB to discuss hysterectomy versus ablation.  She would like to avoid surgery if able we did discuss using tranexamic acid 1.3 g 3 times daily up to 5 days per menstrual period.  She will consider this.    PCOS (polycystic ovarian syndrome)  -     Vitamin B12; Future  -     Hemoglobin A1c; Future  -     Vitamin B12  -     Hemoglobin A1c  - Tolerating 3 pills/day of the metformin.  Did not tolerate 4 pills/day, she would be reasonably agreeable to considering something like Saxenda or Ozempic especially if her A1c is up some.    Morbid obesity (H)   -Considering Saxenda or Ozempic as a possibility.  Discussed possibility of referral to the weight management clinic as well.    Iron deficiency anemia, unspecified iron deficiency anemia type  -     CBC with platelets; Future  -     Iron and iron binding capacity; Future  -     CBC with platelets  -     Iron and iron binding capacity  Updating labs as listed above.  Given her continued heavy menstrual periods I suspect this will still be low, and it has come back at 7.8.  She does not tolerate the oral iron secondary to constipation and stomach upset.- Will discuss with the patient next steps including considering iron infusion if he can get that covered versus more aggressive management of her menstruation.    Lipid screening  -     Lipid panel reflex to direct LDL Non-fasting; Future  -     Lipid panel reflex to direct LDL Non-fasting    Encounter for hepatitis C screening test for low risk patient  -     Hepatitis C antibody; Future  -     Hepatitis C antibody    Cervical cancer screening  -     Pap Screen with HPV - recommended age 30 - 65  "years    Other orders  -     REVIEW OF HEALTH MAINTENANCE PROTOCOL ORDERS        Patient has been advised of split billing requirements and indicates understanding: Yes    COUNSELING:  Reviewed preventive health counseling, as reflected in patient instructions       Regular exercise       Healthy diet/nutrition       Contraception       Advance Care Planning    Estimated body mass index is 41.66 kg/m  as calculated from the following:    Height as of this encounter: 1.727 m (5' 8\").    Weight as of this encounter: 124.3 kg (274 lb).    Weight management plan: Discussed healthy diet and exercise guidelines    She reports that she has never smoked. She has never used smokeless tobacco.      Counseling Resources:  ATP IV Guidelines  Pooled Cohorts Equation Calculator  Breast Cancer Risk Calculator  BRCA-Related Cancer Risk Assessment: FHS-7 Tool  FRAX Risk Assessment  ICSI Preventive Guidelines  Dietary Guidelines for Americans, 2010  USDA's MyPlate  ASA Prophylaxis  Lung CA Screening    Rossy Ridley MD  [facility]  "

## 2022-10-06 LAB
ALBUMIN SERPL BCG-MCNC: 4.6 G/DL (ref 3.5–5.2)
ALP SERPL-CCNC: 57 U/L (ref 35–104)
ALT SERPL W P-5'-P-CCNC: 15 U/L (ref 10–35)
ANION GAP SERPL CALCULATED.3IONS-SCNC: 12 MMOL/L (ref 7–15)
AST SERPL W P-5'-P-CCNC: 23 U/L (ref 10–35)
BILIRUB SERPL-MCNC: 0.7 MG/DL
BUN SERPL-MCNC: 12.6 MG/DL (ref 6–20)
CALCIUM SERPL-MCNC: 9.5 MG/DL (ref 8.6–10)
CHLORIDE SERPL-SCNC: 101 MMOL/L (ref 98–107)
CHOLEST SERPL-MCNC: 199 MG/DL
CREAT SERPL-MCNC: 0.86 MG/DL (ref 0.51–0.95)
DEPRECATED HCO3 PLAS-SCNC: 24 MMOL/L (ref 22–29)
GFR SERPL CREATININE-BSD FRML MDRD: 85 ML/MIN/1.73M2
GLUCOSE SERPL-MCNC: 80 MG/DL (ref 70–99)
HDLC SERPL-MCNC: 49 MG/DL
IRON BINDING CAPACITY (ROCHE): 484 UG/DL (ref 240–430)
IRON SATN MFR SERPL: 4 % (ref 15–46)
IRON SERPL-MCNC: 18 UG/DL (ref 37–145)
LDLC SERPL CALC-MCNC: 126 MG/DL
NONHDLC SERPL-MCNC: 150 MG/DL
POTASSIUM SERPL-SCNC: 4.2 MMOL/L (ref 3.4–5.3)
PROT SERPL-MCNC: 7 G/DL (ref 6.4–8.3)
SODIUM SERPL-SCNC: 137 MMOL/L (ref 136–145)
TRIGL SERPL-MCNC: 120 MG/DL
VIT B12 SERPL-MCNC: 202 PG/ML (ref 232–1245)

## 2022-10-06 RX ORDER — HYDROCHLOROTHIAZIDE 25 MG/1
25 TABLET ORAL DAILY
Qty: 90 TABLET | Refills: 3 | Status: SHIPPED | OUTPATIENT
Start: 2022-10-06 | End: 2023-10-25

## 2022-10-07 LAB — HCV AB SERPL QL IA: NONREACTIVE

## 2022-10-10 DIAGNOSIS — N92.4 EXCESSIVE BLEEDING IN PREMENOPAUSAL PERIOD: Primary | ICD-10-CM

## 2022-10-10 DIAGNOSIS — E53.8 VITAMIN B12 DEFICIENCY (NON ANEMIC): ICD-10-CM

## 2022-10-10 DIAGNOSIS — D50.9 IRON DEFICIENCY ANEMIA, UNSPECIFIED IRON DEFICIENCY ANEMIA TYPE: ICD-10-CM

## 2022-10-10 RX ORDER — TRANEXAMIC ACID 650 MG/1
1300 TABLET ORAL 3 TIMES DAILY
Qty: 30 TABLET | Refills: 3 | Status: SHIPPED | OUTPATIENT
Start: 2022-10-10 | End: 2022-10-15

## 2022-10-12 LAB
BKR LAB AP GYN ADEQUACY: NORMAL
BKR LAB AP GYN INTERPRETATION: NORMAL
BKR LAB AP HPV REFLEX: NORMAL
BKR LAB AP PREVIOUS ABNORMAL: NORMAL
PATH REPORT.COMMENTS IMP SPEC: NORMAL
PATH REPORT.COMMENTS IMP SPEC: NORMAL
PATH REPORT.RELEVANT HX SPEC: NORMAL

## 2022-10-14 LAB
HUMAN PAPILLOMA VIRUS 16 DNA: NEGATIVE
HUMAN PAPILLOMA VIRUS 18 DNA: NEGATIVE
HUMAN PAPILLOMA VIRUS FINAL DIAGNOSIS: NORMAL
HUMAN PAPILLOMA VIRUS OTHER HR: NEGATIVE

## 2022-10-18 DIAGNOSIS — I10 HTN (HYPERTENSION): ICD-10-CM

## 2022-10-18 RX ORDER — LABETALOL 100 MG/1
100 TABLET, FILM COATED ORAL 2 TIMES DAILY
Qty: 180 TABLET | Refills: 3 | Status: SHIPPED | OUTPATIENT
Start: 2022-10-18 | End: 2023-10-25

## 2022-10-18 NOTE — TELEPHONE ENCOUNTER
"Last Written Prescription Date:  7/18/22  Last Fill Quantity: 180,  # refills: 0   Last office visit provider:  10/5/22     Requested Prescriptions   Pending Prescriptions Disp Refills     labetalol (NORMODYNE) 100 MG tablet [Pharmacy Med Name: LABETALOL  MG TABLET] 180 tablet 0     Sig: TAKE 1 TABLET BY MOUTH TWICE A DAY       Beta-Blockers Protocol Passed - 10/18/2022 12:53 PM        Passed - Blood pressure under 140/90 in past 12 months     BP Readings from Last 3 Encounters:   10/05/22 120/80   01/27/21 118/88   11/13/19 114/74                 Passed - Patient is age 6 or older        Passed - Recent (12 mo) or future (30 days) visit within the authorizing provider's specialty     Patient has had an office visit with the authorizing provider or a provider within the authorizing providers department within the previous 12 mos or has a future within next 30 days. See \"Patient Info\" tab in inbasket, or \"Choose Columns\" in Meds & Orders section of the refill encounter.              Passed - Medication is active on med list             Hilton Beebe RN 10/18/22 12:53 PM  "

## 2022-11-11 DIAGNOSIS — E28.2 PCOS (POLYCYSTIC OVARIAN SYNDROME): ICD-10-CM

## 2022-11-12 RX ORDER — METFORMIN HCL 500 MG
TABLET, EXTENDED RELEASE 24 HR ORAL
Qty: 360 TABLET | Refills: 3 | Status: SHIPPED | OUTPATIENT
Start: 2022-11-12 | End: 2023-11-15

## 2022-11-13 NOTE — TELEPHONE ENCOUNTER
"Last Written Prescription Date:  8/8/22  Last Fill Quantity: 360,  # refills: 0   Last office visit provider:  10/5/22     Requested Prescriptions   Pending Prescriptions Disp Refills     metFORMIN (GLUCOPHAGE XR) 500 MG 24 hr tablet [Pharmacy Med Name: METFORMIN HCL  MG TABLET] 360 tablet 0     Sig: TAKE 2 TABLETS BY MOUTH TWICE A DAY       Biguanide Agents Passed - 11/11/2022  2:16 AM        Passed - Patient is age 10 or older        Passed - Recent (12 mo) or future (30 days) visit within the authorizing provider's specialty      Patient has had an office visit with the authorizing provider or a provider within the authorizing providers department within the previous 12 mos or has a future within next 30 days. See \"Patient Info\" tab in inbasket, or \"Choose Columns\" in Meds & Orders section of the refill encounter.              Passed - Patient does NOT have a diagnosis of CHF.        Passed - Medication is active on med list        Passed - Patient is not pregnant        Passed - Patient has not had a positive pregnancy test within the past 12 mos.              Glory Morejon RN 11/12/22 6:57 PM  "

## 2023-09-02 ENCOUNTER — HEALTH MAINTENANCE LETTER (OUTPATIENT)
Age: 43
End: 2023-09-02

## 2023-09-05 ENCOUNTER — PATIENT OUTREACH (OUTPATIENT)
Dept: CARE COORDINATION | Facility: CLINIC | Age: 43
End: 2023-09-05
Payer: COMMERCIAL

## 2023-09-19 ENCOUNTER — PATIENT OUTREACH (OUTPATIENT)
Dept: CARE COORDINATION | Facility: CLINIC | Age: 43
End: 2023-09-19
Payer: COMMERCIAL

## 2023-10-25 DIAGNOSIS — I10 ESSENTIAL HYPERTENSION WITH GOAL BLOOD PRESSURE LESS THAN 130/80: ICD-10-CM

## 2023-10-25 DIAGNOSIS — I10 HTN (HYPERTENSION): ICD-10-CM

## 2023-10-25 RX ORDER — LABETALOL 100 MG/1
100 TABLET, FILM COATED ORAL 2 TIMES DAILY
Qty: 180 TABLET | Refills: 0 | Status: SHIPPED | OUTPATIENT
Start: 2023-10-25 | End: 2024-01-31

## 2023-10-25 RX ORDER — HYDROCHLOROTHIAZIDE 25 MG/1
25 TABLET ORAL DAILY
Qty: 90 TABLET | Refills: 0 | Status: SHIPPED | OUTPATIENT
Start: 2023-10-25 | End: 2024-01-31

## 2023-10-25 NOTE — TELEPHONE ENCOUNTER
Refill Request  Medication name: Pending Prescriptions:                       Disp   Refills    hydrochlorothiazide (HYDRODIURIL) 25 MG t*90 tab*3            Sig: Take 1 tablet (25 mg) by mouth daily    labetalol (NORMODYNE) 100 MG tablet       180 ta*3            Sig: Take 1 tablet (100 mg) by mouth 2 times daily    Requested Pharmacy:  [id number] [place] [address]

## 2023-11-11 ENCOUNTER — HEALTH MAINTENANCE LETTER (OUTPATIENT)
Age: 43
End: 2023-11-11

## 2023-11-15 DIAGNOSIS — E28.2 PCOS (POLYCYSTIC OVARIAN SYNDROME): ICD-10-CM

## 2023-11-15 RX ORDER — METFORMIN HCL 500 MG
1000 TABLET, EXTENDED RELEASE 24 HR ORAL 2 TIMES DAILY
Qty: 360 TABLET | Refills: 1 | Status: SHIPPED | OUTPATIENT
Start: 2023-11-15 | End: 2024-03-20

## 2023-11-15 NOTE — TELEPHONE ENCOUNTER
Refill Request  Medication name: Pending Prescriptions:                       Disp   Refills    metFORMIN (GLUCOPHAGE XR) 500 MG 24 hr ta*360 ta*3            Sig: Take 2 tablets (1,000 mg) by mouth 2 times daily    Requested Pharmacy:  [id number] [place] [address]

## 2024-01-31 DIAGNOSIS — I10 HTN (HYPERTENSION): ICD-10-CM

## 2024-01-31 DIAGNOSIS — I10 ESSENTIAL HYPERTENSION WITH GOAL BLOOD PRESSURE LESS THAN 130/80: ICD-10-CM

## 2024-01-31 RX ORDER — HYDROCHLOROTHIAZIDE 25 MG/1
25 TABLET ORAL DAILY
Qty: 90 TABLET | Refills: 0 | Status: SHIPPED | OUTPATIENT
Start: 2024-01-31 | End: 2024-03-20

## 2024-01-31 RX ORDER — LABETALOL 100 MG/1
100 TABLET, FILM COATED ORAL 2 TIMES DAILY
Qty: 180 TABLET | Refills: 0 | Status: SHIPPED | OUTPATIENT
Start: 2024-01-31 | End: 2024-03-20

## 2024-01-31 NOTE — TELEPHONE ENCOUNTER
Refill Request  Medication name: Pending Prescriptions:                       Disp   Refills    labetalol (NORMODYNE) 100 MG tablet       180 ta*0            Sig: Take 1 tablet (100 mg) by mouth 2 times daily    Requested Pharmacy:  [facility] [address] [place] [address]

## 2024-01-31 NOTE — TELEPHONE ENCOUNTER
Refill Request  Medication name: Pending Prescriptions:                       Disp   Refills    hydrochlorothiazide (HYDRODIURIL) 25 MG t*90 tab*0            Sig: Take 1 tablet (25 mg) by mouth daily    Requested Pharmacy:  [id number] [place] [address]

## 2024-03-20 ENCOUNTER — OFFICE VISIT (OUTPATIENT)
Dept: FAMILY MEDICINE | Facility: CLINIC | Age: 44
End: 2024-03-20
Payer: COMMERCIAL

## 2024-03-20 VITALS
SYSTOLIC BLOOD PRESSURE: 136 MMHG | TEMPERATURE: 98.1 F | HEIGHT: 68 IN | HEART RATE: 63 BPM | RESPIRATION RATE: 16 BRPM | WEIGHT: 274 LBS | OXYGEN SATURATION: 98 % | BODY MASS INDEX: 41.52 KG/M2 | DIASTOLIC BLOOD PRESSURE: 72 MMHG

## 2024-03-20 DIAGNOSIS — E53.8 VITAMIN B12 DEFICIENCY (NON ANEMIC): ICD-10-CM

## 2024-03-20 DIAGNOSIS — E66.01 MORBID OBESITY (H): ICD-10-CM

## 2024-03-20 DIAGNOSIS — Z12.31 VISIT FOR SCREENING MAMMOGRAM: ICD-10-CM

## 2024-03-20 DIAGNOSIS — Z00.00 ENCOUNTER FOR ROUTINE HISTORY AND PHYSICAL EXAMINATION OF ADULT: Primary | ICD-10-CM

## 2024-03-20 DIAGNOSIS — I10 PRIMARY HYPERTENSION: ICD-10-CM

## 2024-03-20 DIAGNOSIS — E28.2 PCOS (POLYCYSTIC OVARIAN SYNDROME): ICD-10-CM

## 2024-03-20 DIAGNOSIS — Z13.220 LIPID SCREENING: ICD-10-CM

## 2024-03-20 DIAGNOSIS — Z12.11 SCREEN FOR COLON CANCER: ICD-10-CM

## 2024-03-20 DIAGNOSIS — D50.0 IRON DEFICIENCY ANEMIA DUE TO CHRONIC BLOOD LOSS: ICD-10-CM

## 2024-03-20 LAB
ERYTHROCYTE [DISTWIDTH] IN BLOOD BY AUTOMATED COUNT: 15.9 % (ref 10–15)
HBA1C MFR BLD: 5.2 % (ref 0–5.6)
HCT VFR BLD AUTO: 29.1 % (ref 35–47)
HGB BLD-MCNC: 8.6 G/DL (ref 11.7–15.7)
MCH RBC QN AUTO: 21.5 PG (ref 26.5–33)
MCHC RBC AUTO-ENTMCNC: 29.6 G/DL (ref 31.5–36.5)
MCV RBC AUTO: 73 FL (ref 78–100)
PLATELET # BLD AUTO: 275 10E3/UL (ref 150–450)
RBC # BLD AUTO: 4 10E6/UL (ref 3.8–5.2)
WBC # BLD AUTO: 5.7 10E3/UL (ref 4–11)

## 2024-03-20 PROCEDURE — 83036 HEMOGLOBIN GLYCOSYLATED A1C: CPT | Performed by: FAMILY MEDICINE

## 2024-03-20 PROCEDURE — 83540 ASSAY OF IRON: CPT | Performed by: FAMILY MEDICINE

## 2024-03-20 PROCEDURE — 85027 COMPLETE CBC AUTOMATED: CPT | Performed by: FAMILY MEDICINE

## 2024-03-20 PROCEDURE — 99396 PREV VISIT EST AGE 40-64: CPT | Performed by: FAMILY MEDICINE

## 2024-03-20 PROCEDURE — 36415 COLL VENOUS BLD VENIPUNCTURE: CPT | Performed by: FAMILY MEDICINE

## 2024-03-20 PROCEDURE — 83550 IRON BINDING TEST: CPT | Performed by: FAMILY MEDICINE

## 2024-03-20 PROCEDURE — 80061 LIPID PANEL: CPT | Performed by: FAMILY MEDICINE

## 2024-03-20 PROCEDURE — 80053 COMPREHEN METABOLIC PANEL: CPT | Performed by: FAMILY MEDICINE

## 2024-03-20 PROCEDURE — 82607 VITAMIN B-12: CPT | Performed by: FAMILY MEDICINE

## 2024-03-20 RX ORDER — HYDROCHLOROTHIAZIDE 25 MG/1
25 TABLET ORAL DAILY
Qty: 90 TABLET | Refills: 3 | Status: SHIPPED | OUTPATIENT
Start: 2024-03-20

## 2024-03-20 RX ORDER — METFORMIN HCL 500 MG
1000 TABLET, EXTENDED RELEASE 24 HR ORAL 2 TIMES DAILY
Qty: 360 TABLET | Refills: 3 | Status: SHIPPED | OUTPATIENT
Start: 2024-03-20

## 2024-03-20 RX ORDER — LABETALOL 100 MG/1
100 TABLET, FILM COATED ORAL 2 TIMES DAILY
Qty: 180 TABLET | Refills: 3 | Status: SHIPPED | OUTPATIENT
Start: 2024-03-20

## 2024-03-20 SDOH — HEALTH STABILITY: PHYSICAL HEALTH: ON AVERAGE, HOW MANY DAYS PER WEEK DO YOU ENGAGE IN MODERATE TO STRENUOUS EXERCISE (LIKE A BRISK WALK)?: 1 DAY

## 2024-03-20 SDOH — HEALTH STABILITY: PHYSICAL HEALTH: ON AVERAGE, HOW MANY MINUTES DO YOU ENGAGE IN EXERCISE AT THIS LEVEL?: 60 MIN

## 2024-03-20 ASSESSMENT — SOCIAL DETERMINANTS OF HEALTH (SDOH): HOW OFTEN DO YOU GET TOGETHER WITH FRIENDS OR RELATIVES?: ONCE A WEEK

## 2024-03-20 NOTE — PROGRESS NOTES
Preventive Care Visit  [facility]  Rossy Ridley MD, Family Medicine  Mar 20, 2024      Assessment & Plan     Encounter for routine history and physical examination of adult  -Routine health maintenance discussion:  No smoking, limited alcohol (7 or less servings per week), 5 fruits/veg servings per day, 200 minutes of exercise per week.  Daily calcium/vitamin D guidelines, bone health, colon cancer screening beginning at age 50.  Accident avoidance, sun screen.     Iron deficiency anemia due to chronic blood loss  -Does have known history of iron deficiency most likely secondary to menorrhagia.  Updating lab work to see how her standing.  Patient remains hesitant to treat her menorrhagia as her periods are very regular and tolerable, will discuss with her mom when she went through menopause to see if we can get a rough idea on how long we may have of continued cycling  - Iron and iron binding capacity  - CBC with platelets  - Iron and iron binding capacity  - CBC with platelets    PCOS (polycystic ovarian syndrome)  -Doing well on the metformin, updating lab work as listed, adjust if needed.  - metFORMIN (GLUCOPHAGE XR) 500 MG 24 hr tablet  Dispense: 360 tablet; Refill: 3  - Hemoglobin A1c  - Hemoglobin A1c    Primary hypertension  -Blood pressure under excellent control, she will continue on her current medications, continue limiting salt as able and exercising as able.  - hydrochlorothiazide (HYDRODIURIL) 25 MG tablet  Dispense: 90 tablet; Refill: 3  - labetalol (NORMODYNE) 100 MG tablet  Dispense: 180 tablet; Refill: 3  - Comprehensive metabolic panel (BMP + Alb, Alk Phos, ALT, AST, Total. Bili, TP)  - Comprehensive metabolic panel (BMP + Alb, Alk Phos, ALT, AST, Total. Bili, TP)    Morbid obesity (H)  -Continue to work on healthful diet and exercise    Vitamin B12 deficiency (non anemic)  -Has had B12 deficiency in the past, will update lab work as listed  - Vitamin B12  -  "Vitamin B12    Visit for screening mammogram  - MA SCREENING DIGITAL BILAT - Future  (s+30)    Screen for colon cancer  - Colonoscopy Screening  Referral    Lipid screening  - Lipid panel reflex to direct LDL Non-fasting  - Lipid panel reflex to direct LDL Non-fasting      Patient has been advised of split billing requirements and indicates understanding: Yes          BMI  Estimated body mass index is 41.66 kg/m  as calculated from the following:    Height as of this encounter: 1.727 m (5' 8\").    Weight as of this encounter: 124.3 kg (274 lb).   Weight management plan: Discussed healthy diet and exercise guidelines    Counseling  Appropriate preventive services were discussed with this patient, including applicable screening as appropriate for fall prevention, nutrition, physical activity, Tobacco-use cessation, weight loss and cognition.  Checklist reviewing preventive services available has been given to the patient.  Reviewed patient's diet, addressing concerns and/or questions.   She is at risk for lack of exercise and has been provided with information to increase physical activity for the benefit of her well-being.           Mark Troncoso is a 43 year old, presenting for the following:  Physical        3/20/2024     2:45 PM   Additional Questions   Roomed by [facility] Care Directive  Patient does not have a Health Care Directive or Living Will: Discussed advance care planning with patient; however, patient declined at this time.    HPI    She is doing well for the most part. Still struggling with diet and exercise due to her three children doing many activities.     She otherwise is doing well.     She did try the TXA and that didn't help much.  She remains hesitant to do much about her periods as they are very regular and predictable and dependable.  She finds bleeding is tolerable in nature.  She is unsure when her mother went through menopause.        3/20/2024   General Health "   How would you rate your overall physical health? Good   Feel stress (tense, anxious, or unable to sleep) Not at all         3/20/2024   Nutrition   Three or more servings of calcium each day? Yes   Diet: Regular (no restrictions)   How many servings of fruit and vegetables per day? (!) 2-3   How many sweetened beverages each day? 0-1         3/20/2024   Exercise   Days per week of moderate/strenous exercise 1 day   Average minutes spent exercising at this level 60 min   (!) EXERCISE CONCERN      3/20/2024   Social Factors   Frequency of gathering with friends or relatives Once a week   Worry food won't last until get money to buy more No   Food not last or not have enough money for food? No   Do you have housing?  Yes   Are you worried about losing your housing? No   Lack of transportation? No   Unable to get utilities (heat,electricity)? No         3/20/2024   Dental   Dentist two times every year? Yes         3/20/2024   TB Screening   Were you born outside of the [place]? No         Today's PHQ-2 Score:       3/20/2024     2:43 PM   PHQ-2 ( 1999 [employer])   Q1: Little interest or pleasure in doing things 0   Q2: Feeling down, depressed or hopeless 0   PHQ-2 Score 0   Q1: Little interest or pleasure in doing things Not at all   Q2: Feeling down, depressed or hopeless Not at all   PHQ-2 Score 0           3/20/2024   Substance Use   Alcohol more than 3/day or more than 7/wk No   Do you use any other substances recreationally? (!) ALCOHOL     Social History     Tobacco Use    Smoking status: Never    Smokeless tobacco: Never   Substance Use Topics    Alcohol use: Yes     Alcohol/week: 1.0 - 2.0 standard drink of alcohol    Drug use: No             3/20/2024   Breast Cancer Screening   Family history of breast, colon, or ovarian cancer? No / Unknown      Mammogram Screening - Mammogram every 1-2 years updated in Health Maintenance based on mutual decision making        3/20/2024   STI Screening   New sexual partner(s)  "since last STI/HIV test? No     History of abnormal Pap smear: NO - age 30-65 PAP every 5 years with negative HPV co-testing recommended        Latest Ref Rng & Units 10/5/2022     3:38 PM 7/14/2015    11:00 AM   PAP / HPV   PAP  Negative for Intraepithelial Lesion or Malignancy (NILM)  Negative for squamous intraepithelial lesion or malignancy  Electronically signed by Nya Han CT (ASCP) on 7/21/2015 at  2:02 PM      HPV 16 DNA Negative Negative     HPV 18 DNA Negative Negative     Other HR HPV Negative Negative       ASCVD Risk   The 10-year ASCVD risk score (Gauri VYAS, et al., 2019) is: 1.5%    Values used to calculate the score:      Age: 43 years      Sex: Female      Is Non- : No      Diabetic: No      Tobacco smoker: No      Systolic Blood Pressure: 136 mmHg      Is BP treated: Yes      HDL Cholesterol: 49 mg/dL      Total Cholesterol: 199 mg/dL       Reviewed and updated as needed this visit by Provider   Tobacco  Allergies  Meds  Problems  Med Hx  Surg Hx  Fam Hx                Review of Systems  Constitutional, HEENT, cardiovascular, pulmonary, gi and gu systems are negative, except as otherwise noted.     Objective    Exam  /72   Pulse 63   Temp 98.1  F (36.7  C)   Resp 16   Ht 1.727 m (5' 8\")   Wt 124.3 kg (274 lb)   LMP 03/04/2024 (Exact Date)   SpO2 98%   BMI 41.66 kg/m     Estimated body mass index is 41.66 kg/m  as calculated from the following:    Height as of this encounter: 1.727 m (5' 8\").    Weight as of this encounter: 124.3 kg (274 lb).    Physical Exam  GENERAL: alert and no distress  EYES: Eyes grossly normal to inspection, PERRL and conjunctivae and sclerae normal  HENT: ear canals and TM's normal, nose and mouth without ulcers or lesions  NECK: no adenopathy, no asymmetry, masses, or scars  RESP: lungs clear to auscultation - no rales, rhonchi or wheezes  BREAST: normal without masses, tenderness or nipple discharge and no " palpable axillary masses or adenopathy  CV: regular rate and rhythm, normal S1 S2, no S3 or S4, no murmur, click or rub, no peripheral edema  ABDOMEN: soft, nontender, no hepatosplenomegaly, no masses and bowel sounds normal  MS: no gross musculoskeletal defects noted, no edema  SKIN: no suspicious lesions or rashes  NEURO: Normal strength and tone, mentation intact and speech normal  PSYCH: mentation appears normal, affect normal/bright        Signed Electronically by: Rossy Ridley MD

## 2024-03-21 PROBLEM — K44.9 HIATAL HERNIA: Status: ACTIVE | Noted: 2024-03-21

## 2024-03-21 PROBLEM — I10 HYPERTENSION: Status: ACTIVE | Noted: 2024-03-21

## 2024-03-21 LAB
ALBUMIN SERPL BCG-MCNC: 4.6 G/DL (ref 3.5–5.2)
ALP SERPL-CCNC: 42 U/L (ref 40–150)
ALT SERPL W P-5'-P-CCNC: 13 U/L (ref 0–50)
ANION GAP SERPL CALCULATED.3IONS-SCNC: 10 MMOL/L (ref 7–15)
AST SERPL W P-5'-P-CCNC: 18 U/L (ref 0–45)
BILIRUB SERPL-MCNC: 0.5 MG/DL
BUN SERPL-MCNC: 11.3 MG/DL (ref 6–20)
CALCIUM SERPL-MCNC: 9.8 MG/DL (ref 8.6–10)
CHLORIDE SERPL-SCNC: 103 MMOL/L (ref 98–107)
CHOLEST SERPL-MCNC: 180 MG/DL
CREAT SERPL-MCNC: 0.88 MG/DL (ref 0.51–0.95)
DEPRECATED HCO3 PLAS-SCNC: 24 MMOL/L (ref 22–29)
EGFRCR SERPLBLD CKD-EPI 2021: 82 ML/MIN/1.73M2
FASTING STATUS PATIENT QL REPORTED: ABNORMAL
GLUCOSE SERPL-MCNC: 85 MG/DL (ref 70–99)
HDLC SERPL-MCNC: 56 MG/DL
IRON BINDING CAPACITY (ROCHE): 363 UG/DL (ref 240–430)
IRON SATN MFR SERPL: 4 % (ref 15–46)
IRON SERPL-MCNC: 16 UG/DL (ref 37–145)
LDLC SERPL CALC-MCNC: 89 MG/DL
NONHDLC SERPL-MCNC: 124 MG/DL
POTASSIUM SERPL-SCNC: 4.3 MMOL/L (ref 3.4–5.3)
PROT SERPL-MCNC: 6.9 G/DL (ref 6.4–8.3)
SODIUM SERPL-SCNC: 137 MMOL/L (ref 135–145)
TRIGL SERPL-MCNC: 174 MG/DL
VIT B12 SERPL-MCNC: 176 PG/ML (ref 232–1245)

## 2024-07-03 ENCOUNTER — TRANSFERRED RECORDS (OUTPATIENT)
Dept: HEALTH INFORMATION MANAGEMENT | Facility: CLINIC | Age: 44
End: 2024-07-03
Payer: COMMERCIAL

## 2024-07-12 ENCOUNTER — TRANSFERRED RECORDS (OUTPATIENT)
Dept: HEALTH INFORMATION MANAGEMENT | Facility: CLINIC | Age: 44
End: 2024-07-12
Payer: COMMERCIAL

## 2025-02-18 ENCOUNTER — PATIENT OUTREACH (OUTPATIENT)
Dept: CARE COORDINATION | Facility: CLINIC | Age: 45
End: 2025-02-18
Payer: COMMERCIAL

## 2025-03-04 ENCOUNTER — PATIENT OUTREACH (OUTPATIENT)
Dept: CARE COORDINATION | Facility: CLINIC | Age: 45
End: 2025-03-04
Payer: COMMERCIAL

## 2025-05-04 ENCOUNTER — HEALTH MAINTENANCE LETTER (OUTPATIENT)
Age: 45
End: 2025-05-04

## 2025-05-28 ENCOUNTER — ANCILLARY PROCEDURE (OUTPATIENT)
Dept: MAMMOGRAPHY | Facility: CLINIC | Age: 45
End: 2025-05-28
Attending: FAMILY MEDICINE
Payer: COMMERCIAL

## 2025-05-28 DIAGNOSIS — Z12.31 VISIT FOR SCREENING MAMMOGRAM: ICD-10-CM

## 2025-05-28 PROCEDURE — 77067 SCR MAMMO BI INCL CAD: CPT | Mod: TC | Performed by: RADIOLOGY

## 2025-05-28 PROCEDURE — 77063 BREAST TOMOSYNTHESIS BI: CPT | Mod: TC | Performed by: RADIOLOGY

## 2025-06-09 DIAGNOSIS — E28.2 PCOS (POLYCYSTIC OVARIAN SYNDROME): ICD-10-CM

## 2025-06-09 RX ORDER — METFORMIN HYDROCHLORIDE 500 MG/1
1000 TABLET, EXTENDED RELEASE ORAL 2 TIMES DAILY
Qty: 360 TABLET | Refills: 1 | Status: SHIPPED | OUTPATIENT
Start: 2025-06-09

## 2025-06-10 ENCOUNTER — HOSPITAL ENCOUNTER (OUTPATIENT)
Dept: MAMMOGRAPHY | Facility: CLINIC | Age: 45
Discharge: HOME | End: 2025-06-10
Attending: FAMILY MEDICINE
Payer: COMMERCIAL

## 2025-06-10 DIAGNOSIS — R92.8 ABNORMAL MAMMOGRAM: ICD-10-CM

## 2025-06-10 PROCEDURE — 76642 ULTRASOUND BREAST LIMITED: CPT | Mod: RT

## 2025-06-10 PROCEDURE — 77061 BREAST TOMOSYNTHESIS UNI: CPT | Mod: RT

## 2025-06-17 ENCOUNTER — HOSPITAL ENCOUNTER (OUTPATIENT)
Dept: MAMMOGRAPHY | Facility: CLINIC | Age: 45
Discharge: HOME | End: 2025-06-17
Attending: FAMILY MEDICINE
Payer: COMMERCIAL

## 2025-06-17 DIAGNOSIS — R92.8 ABNORMAL MAMMOGRAM: ICD-10-CM

## 2025-06-17 PROCEDURE — 88360 TUMOR IMMUNOHISTOCHEM/MANUAL: CPT | Mod: TC | Performed by: FAMILY MEDICINE

## 2025-06-17 PROCEDURE — 19083 BX BREAST 1ST LESION US IMAG: CPT | Mod: RT

## 2025-06-17 PROCEDURE — 999N000065

## 2025-06-17 PROCEDURE — 250N000009 HC RX 250: Performed by: FAMILY MEDICINE

## 2025-06-17 RX ADMIN — LIDOCAINE HYDROCHLORIDE 10 ML: 10 INJECTION, SOLUTION INFILTRATION; PERINEURAL at 07:53

## 2025-06-17 NOTE — PROGRESS NOTES
Karyna arrived at [facility]. I escorted pt to the Breast [place] consult room. Pt was identified using full name and . Wristband is on pt wrist. Pt was able to state which procedure and correct side breast biopsy was occurring today. I reviewed the consent form with the pt and pt was given the consent form to review before signing.     I reviewed post breast biopsy care. Pt acknowledged understanding of post biopsy care. Pt was given written post breast biopsy care handout to take home.    I explained results are expected in 3-5 business days and Breast [facility] RN will call pt at number pt provided today following Radiologist's review of results. Pt has active MyChart, therefore, I explained pathology result alert may occur and reach pt before Radiologist receives and reviews pathology, imaging, and provides correlation to the RN. It is up to pt to decide to view results or wait for RN call. Pt verbalizes understanding and agreement of plan.    Pt had no questions or concerns.     I escorted pt to the changing room and pt changed into gown. Pt placed personal belongings in a locker and pt has the dumont. I escorted pt to US room and pt sat on chair. I offered pt the aroma therapy of Calming Blend oil and a warm blanket, and pt accepted both. I notified [place], Sergey Sandoval, pt was ready and waiting in US room.     Trinity Hurtado, RN, BSN, CBCN  [facility]

## 2025-06-19 ENCOUNTER — TELEPHONE (OUTPATIENT)
Dept: MAMMOGRAPHY | Facility: CLINIC | Age: 45
End: 2025-06-19
Payer: COMMERCIAL

## 2025-06-19 LAB
PATH REPORT.COMMENTS IMP SPEC: ABNORMAL
PATH REPORT.COMMENTS IMP SPEC: YES
PATH REPORT.FINAL DX SPEC: ABNORMAL
PATH REPORT.GROSS SPEC: ABNORMAL
PATH REPORT.MICROSCOPIC SPEC OTHER STN: ABNORMAL
PATH REPORT.RELEVANT HX SPEC: ABNORMAL
PATHOLOGY SYNOPTIC REPORT: ABNORMAL
PHOTO IMAGE: ABNORMAL

## 2025-06-19 PROCEDURE — 88305 TISSUE EXAM BY PATHOLOGIST: CPT | Mod: 26 | Performed by: PATHOLOGY

## 2025-06-19 PROCEDURE — 88342 IMHCHEM/IMCYTCHM 1ST ANTB: CPT | Mod: 26 | Performed by: PATHOLOGY

## 2025-06-19 PROCEDURE — 88360 TUMOR IMMUNOHISTOCHEM/MANUAL: CPT | Mod: 26 | Performed by: PATHOLOGY

## 2025-06-19 NOTE — Clinical Note
Pt has been informed of malignant breast biopsy results. Pt declined making an appt with the breast surgeon during my call, stating she will call me back to schedule. Pt has my direct phone number to call.   Thank you, Trinity MCGARRY RN, BSN [facility]

## 2025-06-19 NOTE — TELEPHONE ENCOUNTER
At the request of Radiologist, Dr. Stacia Isaac, I telephoned patient to inform patient of malignant pathology from 6/17/25, right breast biopsy performed at [facility]. We discussed breast anatomy, pathology findings, and next steps. Patient's questions were answered to the best of my ability.     I offered to schedule pt with breast surgeon for consult during this call, but pt declined, stating she will call me back once she has taken time to think about this. I gave pt my direct number and my hours today.     I provided emotional support and encouragement. Pt verbalized understanding of information given and acceptance of plan. Calls welcomed.    I will CC this note to ordering provider, Dr Rossy Ridley.    Trinity Hurtado, RN, BSN, [facility]